# Patient Record
Sex: FEMALE | Race: WHITE | Employment: OTHER | URBAN - METROPOLITAN AREA
[De-identification: names, ages, dates, MRNs, and addresses within clinical notes are randomized per-mention and may not be internally consistent; named-entity substitution may affect disease eponyms.]

---

## 2017-01-12 ENCOUNTER — TELEPHONE (OUTPATIENT)
Dept: FAMILY MEDICINE | Facility: CLINIC | Age: 62
End: 2017-01-12

## 2017-01-12 NOTE — TELEPHONE ENCOUNTER
Panel Management Review      Patient has the following on her problem list:     Diabetes    ASA: Passed    Last A1C  A1C      6.4   3/24/2016  A1C      6.5   7/1/2015  A1C      6.4   10/3/2014  A1C      6.4   1/29/2014  A1C      6.5   6/17/2013  A1C tested: Passed    Last LDL:    CHOL      208   8/15/2016  HDL       77   8/15/2016  LDL      101   8/15/2016  TRIG      151   8/15/2016  CHOLHDLRATIO      2.0   7/1/2015  NHDL      131   8/15/2016    Is the patient on a Statin? YES             Is the patient on Aspirin? NO    Medications     HMG CoA Reductase Inhibitors    atorvastatin (LIPITOR) 40 MG tablet    Salicylates    aspirin 81 MG tablet          Last three blood pressure readings:  BP Readings from Last 3 Encounters:   03/24/16 150/90   11/20/15 128/84   09/10/15 112/62       Date of last diabetes office visit: 3/2016     Tobacco History:     History   Smoking status     Former Smoker -- 20 years   Smokeless tobacco     Never Used     Comment: 1 pack every 2 weeks---would like to quit             Composite cancer screening  Chart review shows that this patient is due/due soon for the following None  Summary:    Patient is due/failing the following:   A1C and FOLLOW UP    Action needed:   Patient needs office visit for DM 2 and fasting labs March 2017.    Type of outreach:    please call pt.     Questions for provider review:    None                                                                                                                                    Zuri William PA-C       Chart routed to Care Team .

## 2017-01-20 NOTE — TELEPHONE ENCOUNTER
Pt returned call and was given below message, states will call back to schedule an appt     Renee Natarajan/RAUL  [place]---[facility]

## 2017-02-09 DIAGNOSIS — E78.5 HYPERLIPIDEMIA LDL GOAL <100: Primary | ICD-10-CM

## 2017-02-09 RX ORDER — ATORVASTATIN CALCIUM 40 MG/1
40 TABLET, FILM COATED ORAL DAILY
Qty: 90 TABLET | Refills: 0 | Status: SHIPPED | OUTPATIENT
Start: 2017-02-09 | End: 2017-03-21

## 2017-02-09 NOTE — TELEPHONE ENCOUNTER
Atorvastatin 40mg       Last Written Prescription Date: 08/06/2016  Last Fill Quantity: 90, 11/11/2016# refills: 1    Last Office Visit with [facility], P or [facility] prescribing provider:  03/24/2016-Zuri William   Future Office Visit:      BP Readings from Last 3 Encounters:   03/24/16 150/90   11/20/15 128/84   09/10/15 112/62     ALT       29   8/15/2016  CHOL      208   8/15/2016  HDL       77   8/15/2016  LDL      101   8/15/2016  TRIG      151   8/15/2016  CHOLHDLRATIO      2.0   7/1/2015

## 2017-02-09 NOTE — TELEPHONE ENCOUNTER
Routing refill request to provider for review/approval because:  Pt is past due for DM f/u and HCM-   She states she spoke with someone and they said she did not need to be seen until March. She did not want to set up appt at this time.     OK for refill?    Юлия Woo RN\

## 2017-03-15 ENCOUNTER — TELEPHONE (OUTPATIENT)
Dept: FAMILY MEDICINE | Facility: CLINIC | Age: 62
End: 2017-03-15

## 2017-03-15 DIAGNOSIS — E11.9 DIABETES MELLITUS WITHOUT COMPLICATION (H): ICD-10-CM

## 2017-03-15 NOTE — TELEPHONE ENCOUNTER
metFORMIN (GLUCOPHAGE-XR) 500 MG 24 hr tablet         Last Written Prescription Date: 9/22/16  Last Fill Quantity: 90, # refills: 1  Last Office Visit with [facility], [facility] or [facility] prescribing provider:  3/24/2016          BP Readings from Last 3 Encounters:   03/24/16 150/90   11/20/15 128/84   09/10/15 112/62     Lab Results   Component Value Date    MICROL 23 08/15/2016     No results found for: MICROALBUMIN  Creatinine   Date Value Ref Range Status   08/15/2016 0.60 0.52 - 1.04 mg/dL Final   ]  GFR Estimate   Date Value Ref Range Status   08/15/2016 >90  Non  GFR Calc   >60 mL/min/1.7m2 Final   07/01/2015 >90  Non  GFR Calc   >60 mL/min/1.7m2 Final   08/06/2014 87 >60 mL/min/1.7m2 Final     Comment:     Non  GFR Calc     GFR Estimate If Black   Date Value Ref Range Status   08/15/2016 >90   GFR Calc   >60 mL/min/1.7m2 Final   07/01/2015 >90   GFR Calc   >60 mL/min/1.7m2 Final   08/06/2014 >90   GFR Calc   >60 mL/min/1.7m2 Final     Lab Results   Component Value Date    CHOL 208 08/15/2016     Lab Results   Component Value Date    HDL 77 08/15/2016     Lab Results   Component Value Date     08/15/2016     Lab Results   Component Value Date    TRIG 151 08/15/2016     Lab Results   Component Value Date    CHOLHDLRATIO 2.0 07/01/2015     Lab Results   Component Value Date    AST 22 08/15/2016     Lab Results   Component Value Date    ALT 29 08/15/2016     Lab Results   Component Value Date    A1C 6.4 03/24/2016    A1C 6.5 07/01/2015    A1C 6.4 10/03/2014    A1C 6.4 01/29/2014    A1C 6.5 06/17/2013     Potassium   Date Value Ref Range Status   08/15/2016 4.3 3.4 - 5.3 mmol/L Final     LETTY Carlos

## 2017-03-16 RX ORDER — METFORMIN HCL 500 MG
TABLET, EXTENDED RELEASE 24 HR ORAL
Qty: 30 TABLET | Refills: 0 | Status: SHIPPED | OUTPATIENT
Start: 2017-03-16 | End: 2017-03-21

## 2017-03-16 NOTE — TELEPHONE ENCOUNTER
Routing refill request to provider for review/approval because:  Gladys given x1 and patient did not follow up, please advise  Kota Briggs RN, BSN

## 2017-03-21 ENCOUNTER — OFFICE VISIT (OUTPATIENT)
Dept: FAMILY MEDICINE | Facility: CLINIC | Age: 62
End: 2017-03-21
Payer: COMMERCIAL

## 2017-03-21 VITALS
RESPIRATION RATE: 16 BRPM | OXYGEN SATURATION: 98 % | HEIGHT: 63 IN | TEMPERATURE: 98.4 F | WEIGHT: 230.2 LBS | HEART RATE: 76 BPM | BODY MASS INDEX: 40.79 KG/M2 | DIASTOLIC BLOOD PRESSURE: 89 MMHG | SYSTOLIC BLOOD PRESSURE: 168 MMHG

## 2017-03-21 DIAGNOSIS — I10 ESSENTIAL HYPERTENSION WITH GOAL BLOOD PRESSURE LESS THAN 140/90: ICD-10-CM

## 2017-03-21 DIAGNOSIS — E78.5 HYPERLIPIDEMIA LDL GOAL <100: ICD-10-CM

## 2017-03-21 DIAGNOSIS — Z11.59 NEED FOR HEPATITIS C SCREENING TEST: ICD-10-CM

## 2017-03-21 DIAGNOSIS — G47.00 PERSISTENT INSOMNIA: ICD-10-CM

## 2017-03-21 DIAGNOSIS — E11.9 DIABETES MELLITUS WITHOUT COMPLICATION (H): Primary | ICD-10-CM

## 2017-03-21 DIAGNOSIS — Z13.89 SCREENING FOR DIABETIC PERIPHERAL NEUROPATHY: ICD-10-CM

## 2017-03-21 DIAGNOSIS — F33.0 MAJOR DEPRESSIVE DISORDER, RECURRENT EPISODE, MILD (H): ICD-10-CM

## 2017-03-21 LAB
ANION GAP SERPL CALCULATED.3IONS-SCNC: 8 MMOL/L (ref 3–14)
BUN SERPL-MCNC: 14 MG/DL (ref 7–30)
CALCIUM SERPL-MCNC: 8.8 MG/DL (ref 8.5–10.1)
CHLORIDE SERPL-SCNC: 106 MMOL/L (ref 94–109)
CO2 SERPL-SCNC: 28 MMOL/L (ref 20–32)
CREAT SERPL-MCNC: 0.59 MG/DL (ref 0.52–1.04)
GFR SERPL CREATININE-BSD FRML MDRD: ABNORMAL ML/MIN/1.7M2
GLUCOSE SERPL-MCNC: 127 MG/DL (ref 70–99)
HBA1C MFR BLD: 6.6 % (ref 4.3–6)
HCV AB SERPL QL IA: NORMAL
POTASSIUM SERPL-SCNC: 4.6 MMOL/L (ref 3.4–5.3)
SODIUM SERPL-SCNC: 142 MMOL/L (ref 133–144)

## 2017-03-21 PROCEDURE — 86803 HEPATITIS C AB TEST: CPT | Performed by: PHYSICIAN ASSISTANT

## 2017-03-21 PROCEDURE — 99207 C FOOT EXAM  NO CHARGE: CPT | Performed by: PHYSICIAN ASSISTANT

## 2017-03-21 PROCEDURE — 99214 OFFICE O/P EST MOD 30 MIN: CPT | Performed by: PHYSICIAN ASSISTANT

## 2017-03-21 PROCEDURE — 83036 HEMOGLOBIN GLYCOSYLATED A1C: CPT | Performed by: PHYSICIAN ASSISTANT

## 2017-03-21 PROCEDURE — 36415 COLL VENOUS BLD VENIPUNCTURE: CPT | Performed by: PHYSICIAN ASSISTANT

## 2017-03-21 PROCEDURE — 80048 BASIC METABOLIC PNL TOTAL CA: CPT | Performed by: PHYSICIAN ASSISTANT

## 2017-03-21 RX ORDER — ZOLPIDEM TARTRATE 5 MG/1
5 TABLET ORAL
Qty: 30 TABLET | Refills: 1 | Status: SHIPPED | OUTPATIENT
Start: 2017-03-21 | End: 2017-06-07

## 2017-03-21 RX ORDER — LISINOPRIL AND HYDROCHLOROTHIAZIDE 12.5; 2 MG/1; MG/1
1 TABLET ORAL DAILY
Qty: 30 TABLET | Refills: 1 | Status: SHIPPED | OUTPATIENT
Start: 2017-03-21 | End: 2017-04-04

## 2017-03-21 RX ORDER — LISINOPRIL 5 MG/1
5 TABLET ORAL DAILY
Qty: 90 TABLET | Refills: 1 | Status: CANCELLED | OUTPATIENT
Start: 2017-03-21

## 2017-03-21 RX ORDER — ESZOPICLONE 3 MG/1
3 TABLET, FILM COATED ORAL AT BEDTIME
Qty: 90 TABLET | Refills: 1 | Status: CANCELLED | OUTPATIENT
Start: 2017-03-21

## 2017-03-21 RX ORDER — CITALOPRAM HYDROBROMIDE 40 MG/1
40 TABLET ORAL DAILY
Qty: 90 TABLET | Refills: 1 | Status: SHIPPED | OUTPATIENT
Start: 2017-03-21 | End: 2017-10-10

## 2017-03-21 RX ORDER — ATORVASTATIN CALCIUM 40 MG/1
40 TABLET, FILM COATED ORAL DAILY
Qty: 90 TABLET | Refills: 1 | Status: SHIPPED | OUTPATIENT
Start: 2017-03-21 | End: 2017-10-10

## 2017-03-21 RX ORDER — BUPROPION HYDROCHLORIDE 150 MG/1
150 TABLET ORAL EVERY MORNING
Qty: 30 TABLET | Refills: 1 | Status: SHIPPED | OUTPATIENT
Start: 2017-03-21 | End: 2017-04-04

## 2017-03-21 RX ORDER — METFORMIN HCL 500 MG
TABLET, EXTENDED RELEASE 24 HR ORAL
Qty: 90 TABLET | Refills: 1 | Status: SHIPPED | OUTPATIENT
Start: 2017-03-21 | End: 2017-10-04

## 2017-03-21 ASSESSMENT — ANXIETY QUESTIONNAIRES
1. FEELING NERVOUS, ANXIOUS, OR ON EDGE: SEVERAL DAYS
6. BECOMING EASILY ANNOYED OR IRRITABLE: MORE THAN HALF THE DAYS
7. FEELING AFRAID AS IF SOMETHING AWFUL MIGHT HAPPEN: MORE THAN HALF THE DAYS
IF YOU CHECKED OFF ANY PROBLEMS ON THIS QUESTIONNAIRE, HOW DIFFICULT HAVE THESE PROBLEMS MADE IT FOR YOU TO DO YOUR WORK, TAKE CARE OF THINGS AT HOME, OR GET ALONG WITH OTHER PEOPLE: NOT DIFFICULT AT ALL
5. BEING SO RESTLESS THAT IT IS HARD TO SIT STILL: SEVERAL DAYS
3. WORRYING TOO MUCH ABOUT DIFFERENT THINGS: SEVERAL DAYS
GAD7 TOTAL SCORE: 9
2. NOT BEING ABLE TO STOP OR CONTROL WORRYING: SEVERAL DAYS

## 2017-03-21 ASSESSMENT — PATIENT HEALTH QUESTIONNAIRE - PHQ9: 5. POOR APPETITE OR OVEREATING: SEVERAL DAYS

## 2017-03-21 NOTE — PROGRESS NOTES
"    SUBJECTIVE:                                                    Angelique Sharp is a 61 year old female who presents to clinic today for the following health issues:      Diabetes Follow-up      Patient is checking blood sugars: not at all    Diabetic concerns: None     Symptoms of hypoglycemia (low blood sugar): none     Paresthesias (numbness or burning in feet) or sores: No     Date of last diabetic eye exam: 2 months       Depression and Anxiety Follow-Up    Status since last visit: Worsened     Other associated symptoms:over eat=ting,fatgue and little energy     Complicating factors:     Significant life event: Yes-       Current substance abuse: None    PHQ-9 SCORE 7/1/2015 9/10/2015 3/25/2016   Total Score 0 - -   Total Score - 7 9     PILLO-7 SCORE 7/1/2015 9/10/2015 3/25/2016   Total Score 0 - -   Total Score - 7 5        PHQ-9  English      PHQ-9   Any Language     GAD7       Amount of exercise or physical activity: 2-3 days/week for an average of 15-30 minutes    Problems taking medications regularly: No    Medication side effects: none    Diet: regular (no restrictions)      Medication Followup of Lunesta    Taking Medication as prescribed: yes    Side Effects:  None    Medication Helping Symptoms:  Yes    Would like \"cheaper\" generic medication, pharmacy told her this is available.      Hypertension Follow-up      Outpatient blood pressures are not being checked.    Low Salt Diet: not monitoring salt    Never took lisinopril that was prescribed to her.    No symptoms.        Problem list and histories reviewed & adjusted, as indicated.  Additional history: as documented    Patient Active Problem List   Diagnosis     Pain in limb     Other hammer toe (acquired)     Hallux valgus, acquired     Mild major depression (H)     Hypertension     HTN, goal below 140/90     GERD (gastroesophageal reflux disease)     Hyperlipidemia LDL goal <100     Vitamin D deficiency     Bloody diarrhea     Insomnia     " "Obesity     Diabetes mellitus without complication (H)     Past Surgical History   Procedure Laterality Date     C nonspecific procedure       Lt. leg vein stripping     C nonspecific procedure        x 1     C nonspecific procedure       vaginal delivery x 1     Vascular surgery       Colonoscopy       Colonoscopy  2014     Procedure: COLONOSCOPY;  COLONOSCOPY    ;  Surgeon: Stewart Torres MD;  Location:  GI       Social History   Substance Use Topics     Smoking status: Former Smoker     Years: 20.00     Smokeless tobacco: Never Used      Comment: 1 pack every 2 weeks---would like to quit     Alcohol use Yes      Comment: rarely     Family History   Problem Relation Age of Onset     Eye Disorder Maternal Grandmother      glaucoma     DIABETES Maternal Grandmother      Breast Cancer Maternal Aunt            Reviewed and updated as needed this visit by clinical staff  Tobacco  Allergies  Med Hx  Surg Hx  Fam Hx  Soc Hx      Reviewed and updated as needed this visit by Provider         ROS:  Constitutional, HEENT, cardiovascular, pulmonary, GI, , musculoskeletal, neuro, skin, endocrine and psych systems are negative, except as otherwise noted.    OBJECTIVE:                                                    /89  Pulse 76  Temp 98.4  F (36.9  C) (Oral)  Resp 16  Ht 5' 3.25\" (1.607 m)  Wt 230 lb 3.2 oz (104.4 kg)  LMP 2007  SpO2 98%  Breastfeeding? No  BMI 40.46 kg/m2  Body mass index is 40.46 kg/(m^2).  GENERAL APPEARANCE: healthy, alert and no distress  RESP: lungs clear to auscultation - no rales, rhonchi or wheezes  CV: regular rates and rhythm, normal S1 S2, no S3 or S4 and no murmur, click or rub  MS: extremities normal- no gross deformities noted  SKIN: no suspicious lesions or rashes  DIABETIC FOOT EXAM: normal DP and PT pulses, no trophic changes or ulcerative lesions and normal sensory exam  PSYCH: mentation appears normal and affect normal/bright     "     ASSESSMENT/PLAN:                                                            1. Diabetes mellitus without complication (H)  Not checking sugars, encouraged pt to do so,   Work on weight loss  - FOOT EXAM  NO CHARGE [23643.646]  - HEMOGLOBIN A1C  - metFORMIN (GLUCOPHAGE-XR) 500 MG 24 hr tablet; TAKE 1 TABLET (500 MG) BY MOUTH DAILY (WITH DINNER)  Dispense: 90 tablet; Refill: 1    2. Major depressive disorder, recurrent episode, mild (H)  Will add wellbutrin, recheck in 2-3 weeks.   - DEPRESSION ACTION PLAN (DAP) Order [[id number]]  - citalopram (CELEXA) 40 MG tablet; Take 1 tablet (40 mg) by mouth daily  Dispense: 90 tablet; Refill: 1  - buPROPion (WELLBUTRIN XL) 150 MG 24 hr tablet; Take 1 tablet (150 mg) by mouth every morning  Dispense: 30 tablet; Refill: 1    3. Essential hypertension with goal blood pressure less than 140/90  Please start this medication!!!!!  Recheck in 1-2 weeks  The patient indicates understanding of these issues and agrees with the plan.    - lisinopril-hydrochlorothiazide (PRINZIDE/ZESTORETIC) 20-12.5 MG per tablet; Take 1 tablet by mouth daily  Dispense: 30 tablet; Refill: 1  - Basic metabolic panel  (Ca, Cl, CO2, Creat, Gluc, K, Na, BUN)    4. Need for hepatitis C screening test    - Hepatitis C Screen Reflex to HCV RNA Quant and Genotype    5. Hyperlipidemia LDL goal <100    - atorvastatin (LIPITOR) 40 MG tablet; Take 1 tablet (40 mg) by mouth daily  Dispense: 90 tablet; Refill: 1    6. Persistent insomnia  Will change to Ambien from Lunesta, recheck in 3 weeks.   - zolpidem (AMBIEN) 5 MG tablet; Take 1 tablet (5 mg) by mouth nightly as needed for sleep  Dispense: 30 tablet; Refill: 1    7. Screening for diabetic peripheral neuropathy    - FOOT EXAM  NO CHARGE [10644.853]        Zuri William PA-C, PACharlyC  [facility]

## 2017-03-21 NOTE — NURSING NOTE
"Chief Complaint   Patient presents with     Recheck Medication       Initial BP (!) 170/95 (BP Location: Right arm, Patient Position: Chair, Cuff Size: Adult Large)  Pulse 76  Temp 98.4  F (36.9  C) (Oral)  Resp 16  Ht 5' 3.25\" (1.607 m)  Wt 230 lb 3.2 oz (104.4 kg)  LMP 08/01/2007  SpO2 98%  Breastfeeding? No  BMI 40.46 kg/m2 Estimated body mass index is 40.46 kg/(m^2) as calculated from the following:    Height as of this encounter: 5' 3.25\" (1.607 m).    Weight as of this encounter: 230 lb 3.2 oz (104.4 kg).  Medication Reconciliation: complete Toyin Lim MA  Health Maintenance has been reviewed.       "

## 2017-03-21 NOTE — MR AVS SNAPSHOT
After Visit Summary   3/21/2017    Angelique Sharp    MRN: [id number]           Patient Information     Date Of Birth          1955        Visit Information        Provider Department      3/21/2017 7:45 AM Zuri William PA-C [facility]        Today's Diagnoses     Diabetes mellitus without complication (H)    -  1    Need for hepatitis C screening test        Screening for diabetic peripheral neuropathy        Diabetes mellitus without complication        Hyperlipidemia LDL goal <100        Persistent insomnia        Major depressive disorder, recurrent episode, mild (H)        Essential hypertension with goal blood pressure less than 140/90          Care Instructions    Recheck in 2 weeks.   Start BP medication.         Follow-ups after your visit        Who to contact     If you have questions or need follow up information about today's clinic visit or your schedule please contact [facility] directly at [phone].  Normal or non-critical lab and imaging results will be communicated to you by Instamourhart, letter or phone within 4 business days after the clinic has received the results. If you do not hear from us within 7 days, please contact the clinic through Instamourhart or phone. If you have a critical or abnormal lab result, we will notify you by phone as soon as possible.  Submit refill requests through [employer] or call your pharmacy and they will forward the refill request to us. Please allow 3 business days for your refill to be completed.          Additional Information About Your Visit        InstamourharTheCommentor Information     [employer] gives you secure access to your electronic health record. If you see a primary care provider, you can also send messages to your care team and make appointments. If you have questions, please call your primary care clinic.  If you do not have a primary care provider, please call [phone] and they will assist you.    "     Care EveryWhere ID     This is your Care EveryWhere ID. This could be used by other organizations to access your [place] medical records  [id number]        Your Vitals Were     Pulse Temperature Respirations Height Last Period Pulse Oximetry    76 98.4  F (36.9  C) (Oral) 16 5' 3.25\" (1.607 m) 08/01/2007 98%    Breastfeeding? BMI (Body Mass Index)                No 40.46 kg/m2           Blood Pressure from Last 3 Encounters:   03/21/17 (!) 170/95   03/24/16 150/90   11/20/15 128/84    Weight from Last 3 Encounters:   03/21/17 230 lb 3.2 oz (104.4 kg)   03/24/16 222 lb 6.4 oz (100.9 kg)   11/20/15 218 lb (98.9 kg)              We Performed the Following     Basic metabolic panel  (Ca, Cl, CO2, Creat, Gluc, K, Na, BUN)     DEPRESSION ACTION PLAN (DAP) Order [[id number]]     FOOT EXAM  NO CHARGE [85970.114]     HEMOGLOBIN A1C     Hepatitis C Screen Reflex to HCV RNA Quant and Genotype          Today's Medication Changes          These changes are accurate as of: 3/21/17  8:09 AM.  If you have any questions, ask your nurse or doctor.               Start taking these medicines.        Dose/Directions    buPROPion 150 MG 24 hr tablet   Commonly known as:  WELLBUTRIN XL   Used for:  Major depressive disorder, recurrent episode, mild (H)   Started by:  Zuri William PA-C        Dose:  150 mg   Take 1 tablet (150 mg) by mouth every morning   Quantity:  30 tablet   Refills:  1       lisinopril-hydrochlorothiazide 20-12.5 MG per tablet   Commonly known as:  PRINZIDE/ZESTORETIC   Used for:  Essential hypertension with goal blood pressure less than 140/90   Started by:  Zuri William PA-C        Dose:  1 tablet   Take 1 tablet by mouth daily   Quantity:  30 tablet   Refills:  1       zolpidem 5 MG tablet   Commonly known as:  AMBIEN   Used for:  Persistent insomnia   Started by:  Zuri William PA-C        Dose:  5 mg   Take 1 tablet (5 mg) by mouth nightly as needed for sleep   Quantity:  " 30 tablet   Refills:  1         These medicines have changed or have updated prescriptions.        Dose/Directions    atorvastatin 40 MG tablet   Commonly known as:  LIPITOR   This may have changed:  additional instructions   Used for:  Hyperlipidemia LDL goal <100   Changed by:  Zuri William PA-C        Dose:  40 mg   Take 1 tablet (40 mg) by mouth daily   Quantity:  90 tablet   Refills:  1       metFORMIN 500 MG 24 hr tablet   Commonly known as:  GLUCOPHAGE-XR   This may have changed:  See the new instructions.   Used for:  Diabetes mellitus without complication (H)   Changed by:  Zuri William PA-C        TAKE 1 TABLET (500 MG) BY MOUTH DAILY (WITH DINNER)   Quantity:  90 tablet   Refills:  1         Stop taking these medicines if you haven't already. Please contact your care team if you have questions.     lisinopril 5 MG tablet   Commonly known as:  PRINIVIL/ZESTRIL   Stopped by:  Zuri William PA-C                Where to get your medicines      These medications were sent to [place] [address] [place] [facility] [id number] [place] [address]     Phone:  [phone]     atorvastatin 40 MG tablet    buPROPion 150 MG 24 hr tablet    citalopram 40 MG tablet    lisinopril-hydrochlorothiazide 20-12.5 MG per tablet    metFORMIN 500 MG 24 hr tablet         Some of these will need a paper prescription and others can be bought over the counter.  Ask your nurse if you have questions.     Bring a paper prescription for each of these medications     zolpidem 5 MG tablet                Primary Care Provider Office Phone # Fax #    Zuri William PA-C [phone]       [facility] [address] [facility] [address]        Thank you!     Thank you for choosing [facility]  for your care. Our goal is always to provide you with excellent care. Hearing back from our patients is one way we can continue to  improve our services. Please take a few minutes to complete the written survey that you may receive in the mail after your visit with us. Thank you!             Your Updated Medication List - Protect others around you: Learn how to safely use, store and throw away your medicines at www.disposemymeds.org.          This list is accurate as of: 3/21/17  8:09 AM.  Always use your most recent med list.                   Brand Name Dispense Instructions for use    aspirin 81 MG tablet     30 tablet    Take 1 tablet (81 mg) by mouth daily       atorvastatin 40 MG tablet    LIPITOR    90 tablet    Take 1 tablet (40 mg) by mouth daily       blood glucose lancets standard    no brand specified    1 Box    Use to test blood sugar 2 times daily or as directed.       blood glucose monitoring meter device kit    no brand specified    1 kit    Use to test blood sugar 2 times daily or as directed.       blood glucose monitoring test strip    no brand specified    100 each    Use to test blood sugars 2 times daily or as directed       buPROPion 150 MG 24 hr tablet    WELLBUTRIN XL    30 tablet    Take 1 tablet (150 mg) by mouth every morning       citalopram 40 MG tablet    celeXA    90 tablet    Take 1 tablet (40 mg) by mouth daily       eszopiclone 3 MG tablet    LUNESTA    90 tablet    Take 1 tablet (3 mg) by mouth At Bedtime       lisinopril-hydrochlorothiazide 20-12.5 MG per tablet    PRINZIDE/ZESTORETIC    30 tablet    Take 1 tablet by mouth daily       metFORMIN 500 MG 24 hr tablet    GLUCOPHAGE-XR    90 tablet    TAKE 1 TABLET (500 MG) BY MOUTH DAILY (WITH DINNER)       Multi-vitamin Tabs tablet     100 tablet    Take 1 tablet by mouth daily.       omeprazole 20 MG CR capsule    priLOSEC    30 capsule    Take 1 capsule (20 mg) by mouth daily       zolpidem 5 MG tablet    AMBIEN    30 tablet    Take 1 tablet (5 mg) by mouth nightly as needed for sleep

## 2017-03-22 ASSESSMENT — ANXIETY QUESTIONNAIRES: GAD7 TOTAL SCORE: 9

## 2017-03-22 ASSESSMENT — PATIENT HEALTH QUESTIONNAIRE - PHQ9: SUM OF ALL RESPONSES TO PHQ QUESTIONS 1-9: 13

## 2017-04-04 ENCOUNTER — OFFICE VISIT (OUTPATIENT)
Dept: FAMILY MEDICINE | Facility: CLINIC | Age: 62
End: 2017-04-04
Payer: COMMERCIAL

## 2017-04-04 VITALS
OXYGEN SATURATION: 98 % | RESPIRATION RATE: 15 BRPM | SYSTOLIC BLOOD PRESSURE: 137 MMHG | BODY MASS INDEX: 39.51 KG/M2 | HEIGHT: 63 IN | WEIGHT: 223 LBS | TEMPERATURE: 98.4 F | DIASTOLIC BLOOD PRESSURE: 76 MMHG | HEART RATE: 82 BPM

## 2017-04-04 DIAGNOSIS — I10 ESSENTIAL HYPERTENSION WITH GOAL BLOOD PRESSURE LESS THAN 140/90: ICD-10-CM

## 2017-04-04 DIAGNOSIS — K21.9 GASTROESOPHAGEAL REFLUX DISEASE WITHOUT ESOPHAGITIS: ICD-10-CM

## 2017-04-04 DIAGNOSIS — F33.0 MAJOR DEPRESSIVE DISORDER, RECURRENT EPISODE, MILD (H): ICD-10-CM

## 2017-04-04 PROCEDURE — 99214 OFFICE O/P EST MOD 30 MIN: CPT | Performed by: PHYSICIAN ASSISTANT

## 2017-04-04 RX ORDER — LISINOPRIL AND HYDROCHLOROTHIAZIDE 12.5; 2 MG/1; MG/1
1 TABLET ORAL DAILY
Qty: 90 TABLET | Refills: 1 | Status: SHIPPED | OUTPATIENT
Start: 2017-04-04 | End: 2017-05-25 | Stop reason: SINTOL

## 2017-04-04 RX ORDER — BUPROPION HYDROCHLORIDE 150 MG/1
150 TABLET ORAL EVERY MORNING
Qty: 90 TABLET | Refills: 1 | Status: SHIPPED | OUTPATIENT
Start: 2017-04-04 | End: 2017-10-10

## 2017-04-04 NOTE — MR AVS SNAPSHOT
After Visit Summary   4/4/2017    Angelique Sharp    MRN: [id number]           Patient Information     Date Of Birth          1955        Visit Information        Provider Department      4/4/2017 10:30 AM Zuri William PA-C [facility]        Today's Diagnoses     Major depressive disorder, recurrent episode, mild (H)        Gastroesophageal reflux disease without esophagitis        Essential hypertension with goal blood pressure less than 140/90           Follow-ups after your visit        Who to contact     If you have questions or need follow up information about today's clinic visit or your schedule please contact [facility] directly at [phone].  Normal or non-critical lab and imaging results will be communicated to you by MyChart, letter or phone within 4 business days after the clinic has received the results. If you do not hear from us within 7 days, please contact the clinic through Droplet Technologyhart or phone. If you have a critical or abnormal lab result, we will notify you by phone as soon as possible.  Submit refill requests through Trump[employer] or call your pharmacy and they will forward the refill request to us. Please allow 3 business days for your refill to be completed.          Additional Information About Your Visit        MyChart Information     [employer] gives you secure access to your electronic health record. If you see a primary care provider, you can also send messages to your care team and make appointments. If you have questions, please call your primary care clinic.  If you do not have a primary care provider, please call [phone] and they will assist you.        Care EveryWhere ID     This is your Care EveryWhere ID. This could be used by other organizations to access your [place] medical records  [id number]        Your Vitals Were     Pulse Temperature Respirations Height Last Period Pulse Oximetry    82 98.4  F (36.9  " C) (Oral) 15 5' 3.25\" (1.607 m) 08/01/2007 98%    Breastfeeding? BMI (Body Mass Index)                No 39.19 kg/m2           Blood Pressure from Last 3 Encounters:   04/04/17 137/76   03/21/17 168/89   03/24/16 150/90    Weight from Last 3 Encounters:   04/04/17 223 lb (101.2 kg)   03/21/17 230 lb 3.2 oz (104.4 kg)   03/24/16 222 lb 6.4 oz (100.9 kg)              Today, you had the following     No orders found for display         Where to get your medicines      These medications were sent to [place] [address] [place] [facility] [address]  [address] [facility] [address]     Phone:  [phone]     buPROPion 150 MG 24 hr tablet    lisinopril-hydrochlorothiazide 20-12.5 MG per tablet    omeprazole 20 MG CR capsule          Primary Care Provider Office Phone # Fax #    Zuri William PA-C [phone]       [facility] [address] [facility] [address]        Thank you!     Thank you for choosing [facility]  for your care. Our goal is always to provide you with excellent care. Hearing back from our patients is one way we can continue to improve our services. Please take a few minutes to complete the written survey that you may receive in the mail after your visit with us. Thank you!             Your Updated Medication List - Protect others around you: Learn how to safely use, store and throw away your medicines at www.disposemymeds.org.          This list is accurate as of: 4/4/17 10:46 AM.  Always use your most recent med list.                   Brand Name Dispense Instructions for use    aspirin 81 MG tablet     30 tablet    Take 1 tablet (81 mg) by mouth daily       atorvastatin 40 MG tablet    LIPITOR    90 tablet    Take 1 tablet (40 mg) by mouth daily       blood glucose lancets standard    no brand specified    1 Box    Use to test blood sugar 2 times daily or as directed.       blood glucose monitoring meter device kit    no " brand specified    1 kit    Use to test blood sugar 2 times daily or as directed.       blood glucose monitoring test strip    no brand specified    100 each    Use to test blood sugars 2 times daily or as directed       buPROPion 150 MG 24 hr tablet    WELLBUTRIN XL    90 tablet    Take 1 tablet (150 mg) by mouth every morning       citalopram 40 MG tablet    celeXA    90 tablet    Take 1 tablet (40 mg) by mouth daily       eszopiclone 3 MG tablet    LUNESTA    90 tablet    Take 1 tablet (3 mg) by mouth At Bedtime       lisinopril-hydrochlorothiazide 20-12.5 MG per tablet    PRINZIDE/ZESTORETIC    90 tablet    Take 1 tablet by mouth daily       metFORMIN 500 MG 24 hr tablet    GLUCOPHAGE-XR    90 tablet    TAKE 1 TABLET (500 MG) BY MOUTH DAILY (WITH DINNER)       Multi-vitamin Tabs tablet     100 tablet    Take 1 tablet by mouth daily.       omeprazole 20 MG CR capsule    priLOSEC    90 capsule    Take 1 capsule (20 mg) by mouth daily       zolpidem 5 MG tablet    AMBIEN    30 tablet    Take 1 tablet (5 mg) by mouth nightly as needed for sleep

## 2017-04-04 NOTE — PROGRESS NOTES
SUBJECTIVE:                                                    Angelique Sharp is a 61 year old female who presents to clinic today for the following health issues:      Hypertension Follow-up      Outpatient blood pressures are not being checked.    Low Salt Diet: no added salt       Amount of exercise or physical activity: 2-3 days/week for an average of 30-45 minutes    Problems taking medications regularly: No    Medication side effects: none    Diet: low salt        Depression and Anxiety Follow-Up    Status since last visit: Improved     Other associated symptoms:None    Complicating factors:     Significant life event: No     Current substance abuse: None    PHQ-9 SCORE 9/10/2015 3/25/2016 3/21/2017   Total Score - - -   Total Score 7 9 13     PILLO-7 SCORE 9/10/2015 3/25/2016 3/21/2017   Total Score - - -   Total Score 7 5 9        PHQ-9  English      PHQ-9   Any Language     GAD7       Problem list and histories reviewed & adjusted, as indicated.  Additional history: as documented    Patient Active Problem List   Diagnosis     Pain in limb     Other hammer toe (acquired)     Hallux valgus, acquired     Mild major depression (H)     Hypertension     HTN, goal below 140/90     GERD (gastroesophageal reflux disease)     Hyperlipidemia LDL goal <100     Vitamin D deficiency     Bloody diarrhea     Insomnia     Obesity     Diabetes mellitus without complication (H)     Past Surgical History:   Procedure Laterality Date     C NONSPECIFIC PROCEDURE      Lt. leg vein stripping     C NONSPECIFIC PROCEDURE       x 1     C NONSPECIFIC PROCEDURE      vaginal delivery x 1     COLONOSCOPY       COLONOSCOPY  2014    Procedure: COLONOSCOPY;  COLONOSCOPY    ;  Surgeon: Stewart Torres MD;  Location:  GI     VASCULAR SURGERY         Social History   Substance Use Topics     Smoking status: Former Smoker     Years: 20.00     Smokeless tobacco: Never Used      Comment: 1 pack every 2 weeks---would like  "to quit     Alcohol use Yes      Comment: rarely     Family History   Problem Relation Age of Onset     Eye Disorder Maternal Grandmother      glaucoma     DIABETES Maternal Grandmother      Breast Cancer Maternal Aunt            Reviewed and updated as needed this visit by clinical staff       Reviewed and updated as needed this visit by Provider         ROS:  Constitutional, HEENT, cardiovascular, pulmonary, GI, , musculoskeletal, neuro, skin, endocrine and psych systems are negative, except as otherwise noted.    OBJECTIVE:                                                    /76  Pulse 82  Temp 98.4  F (36.9  C) (Oral)  Resp 15  Ht 5' 3.25\" (1.607 m)  Wt 223 lb (101.2 kg)  LMP 08/01/2007  SpO2 98%  Breastfeeding? No  BMI 39.19 kg/m2  Body mass index is 39.19 kg/(m^2).  GENERAL APPEARANCE: healthy, alert and no distress  RESP: lungs clear to auscultation - no rales, rhonchi or wheezes  CV: regular rates and rhythm, normal S1 S2, no S3 or S4 and no murmur, click or rub  MS: extremities normal- no gross deformities noted  SKIN: no suspicious lesions or rashes  NEURO: Normal strength and tone, mentation intact and speech normal  PSYCH: mentation appears normal and affect normal/bright         ASSESSMENT/PLAN:                                                            1. Major depressive disorder, recurrent episode, mild (H)  Using with celexa, mood much improved.   Recheck 6 months.   - buPROPion (WELLBUTRIN XL) 150 MG 24 hr tablet; Take 1 tablet (150 mg) by mouth every morning  Dispense: 90 tablet; Refill: 1    2. Gastroesophageal reflux disease without esophagitis  refill  - omeprazole (PRILOSEC) 20 MG CR capsule; Take 1 capsule (20 mg) by mouth daily  Dispense: 90 capsule; Refill: 3    3. Essential hypertension with goal blood pressure less than 140/90  BP much improved. Checking at home from now on.  Recheck in 6 months.   - lisinopril-hydrochlorothiazide (PRINZIDE/ZESTORETIC) 20-12.5 MG per " tablet; Take 1 tablet by mouth daily  Dispense: 90 tablet; Refill: 1        Zuri William PA-C, PACharlyC  [facility]

## 2017-04-04 NOTE — NURSING NOTE
"Chief Complaint   Patient presents with     Recheck Medication       Initial /83 (BP Location: Right arm, Patient Position: Chair, Cuff Size: Adult Large)  Pulse 82  Temp 98.4  F (36.9  C) (Oral)  Resp 15  Ht 5' 3.25\" (1.607 m)  Wt 223 lb (101.2 kg)  LMP 08/01/2007  SpO2 98%  Breastfeeding? No  BMI 39.19 kg/m2 Estimated body mass index is 39.19 kg/(m^2) as calculated from the following:    Height as of this encounter: 5' 3.25\" (1.607 m).    Weight as of this encounter: 223 lb (101.2 kg).  Medication Reconciliation: complete Toyin Lim MA  Health Maintenance has been reviewed.       "

## 2017-05-25 ENCOUNTER — MYC MEDICAL ADVICE (OUTPATIENT)
Dept: FAMILY MEDICINE | Facility: CLINIC | Age: 62
End: 2017-05-25

## 2017-05-25 DIAGNOSIS — G47.00 PERSISTENT INSOMNIA: ICD-10-CM

## 2017-05-25 DIAGNOSIS — I10 HTN, GOAL BELOW 140/90: Primary | ICD-10-CM

## 2017-05-25 RX ORDER — LOSARTAN POTASSIUM AND HYDROCHLOROTHIAZIDE 12.5; 5 MG/1; MG/1
1 TABLET ORAL DAILY
Qty: 90 TABLET | Refills: 1 | Status: SHIPPED | OUTPATIENT
Start: 2017-05-25 | End: 2017-08-20

## 2017-05-25 RX ORDER — ESZOPICLONE 3 MG/1
3 TABLET, FILM COATED ORAL AT BEDTIME
Qty: 90 TABLET | Refills: 1 | Status: SHIPPED | OUTPATIENT
Start: 2017-05-25 | End: 2017-06-13

## 2017-05-26 ENCOUNTER — TELEPHONE (OUTPATIENT)
Dept: FAMILY MEDICINE | Facility: CLINIC | Age: 62
End: 2017-05-26

## 2017-05-26 DIAGNOSIS — G47.00 PERSISTENT INSOMNIA: ICD-10-CM

## 2017-05-26 NOTE — TELEPHONE ENCOUNTER
Faxed message from [facility]   PA lunesta 3 mg - or please send PA or alt Rx for Zolpidem.   Pt failed zolpidem   PA submitted covermymeds [id number]    Ed Wilkinson RN

## 2017-06-02 NOTE — TELEPHONE ENCOUNTER
We checked status, problem with internet so PA was not submitted in entirety.   We resubmitted today - new KEY: [id number]  [facility] is reviewing your PA request. Typically an electronic response will be received within 72 hours.   Ed Wilkinson RN

## 2017-06-07 NOTE — TELEPHONE ENCOUNTER
Pt calls, informed, willing to go back to zolpidem, needs new rx sent, ok for CJ 6/8, routed, inform pt only if problem, she will f/u with pharmacy    Michelle Acuna RN, BSN  Message handled by Nurse Triage.

## 2017-06-07 NOTE — TELEPHONE ENCOUNTER
Left message for pt to call back re Lunesta 3 mg PA.    Faxed message dated 6/6/17 from [employer].   No PA. Product is plan exclusion for this member so there is no coverage criteria to review and apply. The member may call number on the back of their ID card.   We informed [facility] PA denied.   PA denial placed in Zuri's office for [facility] next week.  Ed Wilkinson RN

## 2017-06-13 RX ORDER — ZOLPIDEM TARTRATE 5 MG/1
5 TABLET ORAL
Qty: 90 TABLET | Refills: 0 | Status: SHIPPED | OUTPATIENT
Start: 2017-06-13 | End: 2017-09-22

## 2017-06-13 NOTE — TELEPHONE ENCOUNTER
Left message on [facility] updating no PA Sherrona, changed to zolpidem Call if questions.   Ed Wilkinson RN

## 2017-08-02 ENCOUNTER — TRANSFERRED RECORDS (OUTPATIENT)
Dept: HEALTH INFORMATION MANAGEMENT | Facility: CLINIC | Age: 62
End: 2017-08-02

## 2017-08-20 DIAGNOSIS — I10 HTN, GOAL BELOW 140/90: ICD-10-CM

## 2017-08-20 NOTE — TELEPHONE ENCOUNTER
Pending Prescriptions:                       Disp   Refills    losartan-hydrochlorothiazide (HYZAAR) 50-*90 tab*1            Sig: TAKE 1 TABLET BY MOUTH DAILY              Last Written Prescription Date: 5/25/2017  Last Fill Quantity: 90, # refills: 1  Last Office Visit with HELEN, ANGELO or [facility] prescribing provider: 4/4/2017Stewart             Potassium   Date Value Ref Range Status   03/21/2017 4.6 3.4 - 5.3 mmol/L Final     Creatinine   Date Value Ref Range Status   03/21/2017 0.59 0.52 - 1.04 mg/dL Final     BP Readings from Last 3 Encounters:   04/04/17 137/76   03/21/17 168/89   03/24/16 150/90

## 2017-08-21 RX ORDER — LOSARTAN POTASSIUM AND HYDROCHLOROTHIAZIDE 12.5; 5 MG/1; MG/1
1 TABLET ORAL DAILY
Qty: 90 TABLET | Refills: 0 | Status: SHIPPED | OUTPATIENT
Start: 2017-08-21 | End: 2017-10-10

## 2017-08-21 NOTE — TELEPHONE ENCOUNTER
Medication is being filled for 1 time refill only due to:  Patient needs to be seen because due in October for 6 month follow up per LOV.     Kota Briggs RN, BSN

## 2017-09-22 ENCOUNTER — TELEPHONE (OUTPATIENT)
Dept: FAMILY MEDICINE | Facility: CLINIC | Age: 62
End: 2017-09-22

## 2017-09-22 DIAGNOSIS — G47.00 PERSISTENT INSOMNIA: ICD-10-CM

## 2017-09-22 NOTE — TELEPHONE ENCOUNTER
Zolpidem      Routing refill request to provider for review/approval because:  Drug not on the [facility], UMP or  Health refill protocol or controlled substance      RX [place] reviewed:  reviewed- no concerns    [place]  [email or web address]      Last filled:    6/14/2017, #90    Doris BRIAN RN, BSN, PHN  [place] [facility]

## 2017-09-22 NOTE — TELEPHONE ENCOUNTER
Controlled Substance Refill Request for ZOLPIDEM TARTRATE 5 MG TABLET    Problem List Complete:  No     PROVIDER TO CONSIDER COMPLETION OF PROBLEM LIST AND OVERVIEW/CONTROLLED SUBSTANCE AGREEMENT    Last Written Prescription Date:  06/13/17  Last Fill Quantity: 90,   # refills: 0    Last Office Visit with G primary care provider: 04/04/17 Zuri Bajwa Office visit:     Controlled substance agreement on file: No.     Processing:  Fax Rx to [facility] pharmacy     checked in past 6 months?  No, route to RN

## 2017-09-26 RX ORDER — ZOLPIDEM TARTRATE 5 MG/1
TABLET ORAL
Qty: 90 TABLET | Refills: 0 | Status: SHIPPED | OUTPATIENT
Start: 2017-09-26 | End: 2017-10-10 | Stop reason: DRUGHIGH

## 2017-10-04 ENCOUNTER — TELEPHONE (OUTPATIENT)
Dept: FAMILY MEDICINE | Facility: CLINIC | Age: 62
End: 2017-10-04

## 2017-10-04 DIAGNOSIS — E11.9 DIABETES MELLITUS WITHOUT COMPLICATION (H): ICD-10-CM

## 2017-10-04 NOTE — TELEPHONE ENCOUNTER
METFORMIN  MG TABLET           Last Written Prescription Date: 03/21/17  Last Fill Quantity: 90, # refills: 1  Last Office Visit with G, P or [facility] prescribing provider:  04/04/17 Zuri nina        BP Readings from Last 3 Encounters:   04/04/17 137/76   03/21/17 168/89   03/24/16 150/90     Lab Results   Component Value Date    MICROL 23 08/15/2016     Lab Results   Component Value Date    UMALCR 15.23 08/15/2016     Creatinine   Date Value Ref Range Status   03/21/2017 0.59 0.52 - 1.04 mg/dL Final   ]  GFR Estimate   Date Value Ref Range Status   03/21/2017 >90  Non  GFR Calc   >60 mL/min/1.7m2 Final   08/15/2016 >90  Non  GFR Calc   >60 mL/min/1.7m2 Final   07/01/2015 >90  Non  GFR Calc   >60 mL/min/1.7m2 Final     GFR Estimate If Black   Date Value Ref Range Status   03/21/2017 >90   GFR Calc   >60 mL/min/1.7m2 Final   08/15/2016 >90   GFR Calc   >60 mL/min/1.7m2 Final   07/01/2015 >90   GFR Calc   >60 mL/min/1.7m2 Final     Lab Results   Component Value Date    CHOL 208 08/15/2016     Lab Results   Component Value Date    HDL 77 08/15/2016     Lab Results   Component Value Date     08/15/2016     Lab Results   Component Value Date    TRIG 151 08/15/2016     Lab Results   Component Value Date    CHOLHDLRATIO 2.0 07/01/2015     Lab Results   Component Value Date    AST 22 08/15/2016     Lab Results   Component Value Date    ALT 29 08/15/2016     Lab Results   Component Value Date    A1C 6.6 03/21/2017    A1C 6.4 03/24/2016    A1C 6.5 07/01/2015    A1C 6.4 10/03/2014    A1C 6.4 01/29/2014     Potassium   Date Value Ref Range Status   03/21/2017 4.6 3.4 - 5.3 mmol/L Final

## 2017-10-05 RX ORDER — METFORMIN HCL 500 MG
TABLET, EXTENDED RELEASE 24 HR ORAL
Qty: 90 TABLET | Refills: 0 | Status: SHIPPED | OUTPATIENT
Start: 2017-10-05 | End: 2017-10-10

## 2017-10-05 NOTE — TELEPHONE ENCOUNTER
Routing refill request to provider for review/approval because:  Labs not current:    Annual recommendations -   Creatinine and eGFR   LDL   microalbumin   BP every 6 months, <140/90   HgbA1c every 3 months >8   HgbA1c every 6 months <8        Due for microalb, BP, lipids, OV    Flor Paz RN, BS  Clinical Nurse Triage.

## 2017-10-10 ENCOUNTER — OFFICE VISIT (OUTPATIENT)
Dept: FAMILY MEDICINE | Facility: CLINIC | Age: 62
End: 2017-10-10
Payer: COMMERCIAL

## 2017-10-10 VITALS
SYSTOLIC BLOOD PRESSURE: 122 MMHG | DIASTOLIC BLOOD PRESSURE: 80 MMHG | TEMPERATURE: 98.4 F | BODY MASS INDEX: 38.16 KG/M2 | HEART RATE: 77 BPM | HEIGHT: 63 IN | OXYGEN SATURATION: 97 % | WEIGHT: 215.4 LBS

## 2017-10-10 DIAGNOSIS — E78.5 HYPERLIPIDEMIA LDL GOAL <100: ICD-10-CM

## 2017-10-10 DIAGNOSIS — I10 HTN, GOAL BELOW 140/90: ICD-10-CM

## 2017-10-10 DIAGNOSIS — G47.00 PERSISTENT INSOMNIA: ICD-10-CM

## 2017-10-10 DIAGNOSIS — E11.9 DIABETES MELLITUS WITHOUT COMPLICATION (H): Primary | ICD-10-CM

## 2017-10-10 DIAGNOSIS — F33.0 MAJOR DEPRESSIVE DISORDER, RECURRENT EPISODE, MILD (H): ICD-10-CM

## 2017-10-10 DIAGNOSIS — F41.8 SITUATIONAL ANXIETY: ICD-10-CM

## 2017-10-10 DIAGNOSIS — K21.9 GASTROESOPHAGEAL REFLUX DISEASE WITHOUT ESOPHAGITIS: ICD-10-CM

## 2017-10-10 LAB
CHOLEST SERPL-MCNC: 166 MG/DL
CREAT UR-MCNC: 353 MG/DL
HBA1C MFR BLD: 6.3 % (ref 4.3–6)
HDLC SERPL-MCNC: 62 MG/DL
LDLC SERPL CALC-MCNC: 78 MG/DL
MICROALBUMIN UR-MCNC: 29 MG/L
MICROALBUMIN/CREAT UR: 8.3 MG/G CR (ref 0–25)
NONHDLC SERPL-MCNC: 104 MG/DL
TRIGL SERPL-MCNC: 131 MG/DL

## 2017-10-10 PROCEDURE — 99214 OFFICE O/P EST MOD 30 MIN: CPT | Performed by: PHYSICIAN ASSISTANT

## 2017-10-10 PROCEDURE — 36415 COLL VENOUS BLD VENIPUNCTURE: CPT | Performed by: PHYSICIAN ASSISTANT

## 2017-10-10 PROCEDURE — 80061 LIPID PANEL: CPT | Performed by: PHYSICIAN ASSISTANT

## 2017-10-10 PROCEDURE — 82043 UR ALBUMIN QUANTITATIVE: CPT | Performed by: PHYSICIAN ASSISTANT

## 2017-10-10 PROCEDURE — 83036 HEMOGLOBIN GLYCOSYLATED A1C: CPT | Performed by: PHYSICIAN ASSISTANT

## 2017-10-10 RX ORDER — CITALOPRAM HYDROBROMIDE 40 MG/1
40 TABLET ORAL DAILY
Qty: 90 TABLET | Refills: 1 | Status: SHIPPED | OUTPATIENT
Start: 2017-10-10 | End: 2018-04-17

## 2017-10-10 RX ORDER — BUPROPION HYDROCHLORIDE 150 MG/1
150 TABLET ORAL EVERY MORNING
Qty: 90 TABLET | Refills: 1 | Status: SHIPPED | OUTPATIENT
Start: 2017-10-10 | End: 2018-04-12

## 2017-10-10 RX ORDER — ZOLPIDEM TARTRATE 10 MG/1
10 TABLET ORAL
Qty: 30 TABLET | Refills: 5 | Status: SHIPPED | OUTPATIENT
Start: 2017-10-10 | End: 2018-04-02

## 2017-10-10 RX ORDER — METFORMIN HCL 500 MG
TABLET, EXTENDED RELEASE 24 HR ORAL
Qty: 90 TABLET | Refills: 1 | Status: SHIPPED | OUTPATIENT
Start: 2017-10-10 | End: 2018-04-17

## 2017-10-10 RX ORDER — ATORVASTATIN CALCIUM 40 MG/1
40 TABLET, FILM COATED ORAL DAILY
Qty: 90 TABLET | Refills: 3 | Status: SHIPPED | OUTPATIENT
Start: 2017-10-10 | End: 2018-10-12

## 2017-10-10 RX ORDER — LOSARTAN POTASSIUM AND HYDROCHLOROTHIAZIDE 12.5; 5 MG/1; MG/1
1 TABLET ORAL DAILY
Qty: 90 TABLET | Refills: 1 | Status: SHIPPED | OUTPATIENT
Start: 2017-10-10 | End: 2018-04-17

## 2017-10-10 RX ORDER — OMEPRAZOLE 40 MG/1
40 CAPSULE, DELAYED RELEASE ORAL DAILY
Qty: 90 CAPSULE | Refills: 3 | Status: SHIPPED | OUTPATIENT
Start: 2017-10-10 | End: 2018-10-31

## 2017-10-10 RX ORDER — BUSPIRONE HYDROCHLORIDE 5 MG/1
TABLET ORAL
Qty: 150 TABLET | Refills: 0 | Status: SHIPPED | OUTPATIENT
Start: 2017-10-10 | End: 2017-10-27

## 2017-10-10 ASSESSMENT — ANXIETY QUESTIONNAIRES
3. WORRYING TOO MUCH ABOUT DIFFERENT THINGS: NEARLY EVERY DAY
7. FEELING AFRAID AS IF SOMETHING AWFUL MIGHT HAPPEN: MORE THAN HALF THE DAYS
GAD7 TOTAL SCORE: 20
1. FEELING NERVOUS, ANXIOUS, OR ON EDGE: NEARLY EVERY DAY
6. BECOMING EASILY ANNOYED OR IRRITABLE: NEARLY EVERY DAY
5. BEING SO RESTLESS THAT IT IS HARD TO SIT STILL: NEARLY EVERY DAY
IF YOU CHECKED OFF ANY PROBLEMS ON THIS QUESTIONNAIRE, HOW DIFFICULT HAVE THESE PROBLEMS MADE IT FOR YOU TO DO YOUR WORK, TAKE CARE OF THINGS AT HOME, OR GET ALONG WITH OTHER PEOPLE: VERY DIFFICULT
2. NOT BEING ABLE TO STOP OR CONTROL WORRYING: NEARLY EVERY DAY

## 2017-10-10 ASSESSMENT — PATIENT HEALTH QUESTIONNAIRE - PHQ9
SUM OF ALL RESPONSES TO PHQ QUESTIONS 1-9: 21
5. POOR APPETITE OR OVEREATING: NEARLY EVERY DAY

## 2017-10-10 NOTE — NURSING NOTE
"Chief Complaint   Patient presents with     Recheck Medication     Diabetes       Initial /80 (BP Location: Right arm, Patient Position: Chair, Cuff Size: Adult Large)  Pulse 77  Temp 98.4  F (36.9  C) (Oral)  Ht 5' 3.25\" (1.607 m)  Wt 215 lb 6.4 oz (97.7 kg)  LMP 08/01/2007  SpO2 97%  Breastfeeding? No  BMI 37.86 kg/m2 Estimated body mass index is 37.86 kg/(m^2) as calculated from the following:    Height as of this encounter: 5' 3.25\" (1.607 m).    Weight as of this encounter: 215 lb 6.4 oz (97.7 kg).  Medication Reconciliation: complete Toyin Lim MA  Health Maintenance has been reviewed.       "

## 2017-10-10 NOTE — PROGRESS NOTES
SUBJECTIVE:   Angelique Sharp is a 61 year old female who presents to clinic today for the following health issues:      Diabetes Follow-up    Patient is checking blood sugars: once daily.  Results are as follows:       am -        Diabetic concerns: None     Symptoms of hypoglycemia (low blood sugar): none     Paresthesias (numbness or burning in feet) or sores: No     Date of last diabetic eye exam: 4 months ago    Hyperlipidemia Follow-Up      Rate your low fat/cholesterol diet?: good    Taking statin?  Yes, no muscle aches from statin    Other lipid medications/supplements?:  none    Hypertension Follow-up      Outpatient blood pressures are not being checked.  Low Salt Diet: no added salt  Depression and Anxiety Follow-Up    Status since last visit: Worsened     Other associated symptoms:More agitation, not sleeping well     Complicating factors:     Significant life event: Yes-  Dealing with parents that both live in different states     Current substance abuse: None    PHQ-9 Score and MyChart F/U Questions 9/10/2015 3/25/2016 3/21/2017   Total Score 7 9 13   Q9: Suicide Ideation Not at all Not at all Several days     PILLO-7 SCORE 9/10/2015 3/25/2016 3/21/2017   Total Score - - -   Total Score 7 5 9       PHQ-9  English  PHQ-9   Any Language  GAD7  Suicide Assessment Five-step Evaluation and Treatment (SAFE-T)        Amount of exercise or physical activity: 6-7 days/week for an average of 45-60 minutes    Problems taking medications regularly: No    Medication side effects: none  Diet: regular (no restrictions)          Problem list and histories reviewed & adjusted, as indicated.  Additional history: as documented    Patient Active Problem List   Diagnosis     Pain in limb     Other hammer toe (acquired)     Hallux valgus, acquired     Mild major depression (H)     Hypertension     HTN, goal below 140/90     GERD (gastroesophageal reflux disease)     Hyperlipidemia LDL goal <100     Vitamin D deficiency  "    Bloody diarrhea     Insomnia     Obesity     Diabetes mellitus without complication (H)     Past Surgical History:   Procedure Laterality Date     C NONSPECIFIC PROCEDURE      Lt. leg vein stripping     C NONSPECIFIC PROCEDURE       x 1     C NONSPECIFIC PROCEDURE      vaginal delivery x 1     COLONOSCOPY       COLONOSCOPY  2014    Procedure: COLONOSCOPY;  COLONOSCOPY    ;  Surgeon: Stewart Torres MD;  Location:  GI     VASCULAR SURGERY         Social History   Substance Use Topics     Smoking status: Former Smoker     Years: 20.00     Smokeless tobacco: Never Used      Comment: 1 pack every 2 weeks---would like to quit     Alcohol use Yes      Comment: rarely     Family History   Problem Relation Age of Onset     Eye Disorder Maternal Grandmother      glaucoma     DIABETES Maternal Grandmother      Breast Cancer Maternal Aunt              Reviewed and updated as needed this visit by clinical staffTobacco  Allergies  Med Hx  Surg Hx  Fam Hx  Soc Hx      Reviewed and updated as needed this visit by Provider         ROS:  Constitutional, HEENT, cardiovascular, pulmonary, GI, , musculoskeletal, neuro, skin, endocrine and psych systems are negative, except as otherwise noted.      OBJECTIVE:   /80 (BP Location: Right arm, Patient Position: Chair, Cuff Size: Adult Large)  Pulse 77  Temp 98.4  F (36.9  C) (Oral)  Ht 5' 3.25\" (1.607 m)  Wt 215 lb 6.4 oz (97.7 kg)  LMP 2007  SpO2 97%  Breastfeeding? No  BMI 37.86 kg/m2  Body mass index is 37.86 kg/(m^2).  GENERAL APPEARANCE: healthy, alert and no distress  RESP: lungs clear to auscultation - no rales, rhonchi or wheezes  CV: regular rates and rhythm, normal S1 S2, no S3 or S4 and no murmur, click or rub  SKIN: no suspicious lesions or rashes  NEURO: Normal strength and tone, mentation intact and speech normal  PSYCH: mentation appears normal and affect normal/bright        ASSESSMENT/PLAN:             1. Diabetes mellitus " without complication (H)  Await labs.   Recheck 3-6 months depending on labs.   - blood glucose (NO BRAND SPECIFIED) lancets standard; Use to test blood sugar 2 times daily or as directed.  Dispense: 100 each; Refill: 1  - blood glucose monitoring (NO BRAND SPECIFIED) test strip; Use to test blood sugars 2 times daily or as directed  Dispense: 100 each; Refill: 3  - Hemoglobin A1c  - Albumin Random Urine Quantitative with Creat Ratio  - Lipid panel reflex to direct LDL  - metFORMIN (GLUCOPHAGE-XR) 500 MG 24 hr tablet; TAKE 1 TABLET (500 MG) BY MOUTH DAILY (WITH DINNER)  Dispense: 90 tablet; Refill: 1    2. Major depressive disorder, recurrent episode, mild (H)  Stable. Recheck in 6 months.   - buPROPion (WELLBUTRIN XL) 150 MG 24 hr tablet; Take 1 tablet (150 mg) by mouth every morning  Dispense: 90 tablet; Refill: 1  - citalopram (CELEXA) 40 MG tablet; Take 1 tablet (40 mg) by mouth daily  Dispense: 90 tablet; Refill: 1    3. Situational anxiety  Trial of Buspar   - busPIRone (BUSPAR) 5 MG tablet; Start at 5 mg twice daily for 3 days, then 7.5 mg (1.5 tabs) twice daily for 3 days, then 10 mg (2 tabs) twice daily for 3 days, then 12.5 mg (2.5 tabs) twice daily for 3 days, then 15 mg (3 tabs) twice daily and stay at that dose  Dispense: 150 tablet; Refill: 0    4. Gastroesophageal reflux disease without esophagitis  Will increase to 40 mg daily.   - omeprazole (PRILOSEC) 40 MG capsule; Take 1 capsule (40 mg) by mouth daily Take 30-60 minutes before a meal.  Dispense: 90 capsule; Refill: 3    5. HTN, goal below 140/90  Stable.   - losartan-hydrochlorothiazide (HYZAAR) 50-12.5 MG per tablet; Take 1 tablet by mouth daily SEE PCP OCTOBER  Dispense: 90 tablet; Refill: 1    6. Hyperlipidemia LDL goal <100  Await labs,   - atorvastatin (LIPITOR) 40 MG tablet; Take 1 tablet (40 mg) by mouth daily  Dispense: 90 tablet; Refill: 3    7. Persistent insomnia  Will increase to 10 mg daily, faxed.   - zolpidem (AMBIEN) 10 MG tablet;  Take 1 tablet (10 mg) by mouth nightly as needed for sleep  Dispense: 30 tablet; Refill: 5        Zuri William PA-C, PA-C  [facility]

## 2017-10-10 NOTE — MR AVS SNAPSHOT
After Visit Summary   10/10/2017    Angelique Sharp    MRN: [id number]           Patient Information     Date Of Birth          1955        Visit Information        Provider Department      10/10/2017 8:00 AM Zuri William PA-C [facility]        Today's Diagnoses     Diabetes mellitus without complication (H)    -  1    Major depressive disorder, recurrent episode, mild (H)        Situational anxiety        Gastroesophageal reflux disease without esophagitis        HTN, goal below 140/90        Hyperlipidemia LDL goal <100        Persistent insomnia           Follow-ups after your visit        Who to contact     If you have questions or need follow up information about today's clinic visit or your schedule please contact [facility] directly at [phone].  Normal or non-critical lab and imaging results will be communicated to you by Think-Nowhart, letter or phone within 4 business days after the clinic has received the results. If you do not hear from us within 7 days, please contact the clinic through Think-Nowhart or phone. If you have a critical or abnormal lab result, we will notify you by phone as soon as possible.  Submit refill requests through [email or web address] or call your pharmacy and they will forward the refill request to us. Please allow 3 business days for your refill to be completed.          Additional Information About Your Visit        MyChart Information     [email or web address] gives you secure access to your electronic health record. If you see a primary care provider, you can also send messages to your care team and make appointments. If you have questions, please call your primary care clinic.  If you do not have a primary care provider, please call [phone] and they will assist you.        Care EveryWhere ID     This is your Care EveryWhere ID. This could be used by other organizations to access your [facility] medical records  [id number]       "  Your Vitals Were     Pulse Temperature Height Last Period Pulse Oximetry Breastfeeding?    77 98.4  F (36.9  C) (Oral) 5' 3.25\" (1.607 m) 08/01/2007 97% No    BMI (Body Mass Index)                   37.86 kg/m2            Blood Pressure from Last 3 Encounters:   10/10/17 122/80   04/04/17 137/76   03/21/17 168/89    Weight from Last 3 Encounters:   10/10/17 215 lb 6.4 oz (97.7 kg)   04/04/17 223 lb (101.2 kg)   03/21/17 230 lb 3.2 oz (104.4 kg)              We Performed the Following     Albumin Random Urine Quantitative with Creat Ratio     Hemoglobin A1c     Lipid panel reflex to direct LDL          Today's Medication Changes          These changes are accurate as of: 10/10/17  8:33 AM.  If you have any questions, ask your nurse or doctor.               Start taking these medicines.        Dose/Directions    busPIRone 5 MG tablet   Commonly known as:  BUSPAR   Used for:  Situational anxiety   Started by:  Zuri William PA-C        Start at 5 mg twice daily for 3 days, then 7.5 mg (1.5 tabs) twice daily for 3 days, then 10 mg (2 tabs) twice daily for 3 days, then 12.5 mg (2.5 tabs) twice daily for 3 days, then 15 mg (3 tabs) twice daily and stay at that dose   Quantity:  150 tablet   Refills:  0         These medicines have changed or have updated prescriptions.        Dose/Directions    metFORMIN 500 MG 24 hr tablet   Commonly known as:  GLUCOPHAGE-XR   This may have changed:  See the new instructions.   Used for:  Diabetes mellitus without complication (H)   Changed by:  Zuri William PA-C        TAKE 1 TABLET (500 MG) BY MOUTH DAILY (WITH DINNER)   Quantity:  90 tablet   Refills:  1       omeprazole 40 MG capsule   Commonly known as:  priLOSEC   This may have changed:    - medication strength  - how much to take  - additional instructions   Used for:  Gastroesophageal reflux disease without esophagitis   Changed by:  Zuri William PA-C        Dose:  40 mg   Take 1 capsule (40 " mg) by mouth daily Take 30-60 minutes before a meal.   Quantity:  90 capsule   Refills:  3       zolpidem 10 MG tablet   Commonly known as:  AMBIEN   This may have changed:  See the new instructions.   Used for:  Persistent insomnia   Changed by:  Zuri William PA-C        Dose:  10 mg   Take 1 tablet (10 mg) by mouth nightly as needed for sleep   Quantity:  30 tablet   Refills:  5            Where to get your medicines      These medications were sent to [place] [address] [place] [facility] [address]  [address] [facility] [address]     Phone:  [phone]     atorvastatin 40 MG tablet    blood glucose lancets standard    blood glucose monitoring test strip    buPROPion 150 MG 24 hr tablet    citalopram 40 MG tablet    losartan-hydrochlorothiazide 50-12.5 MG per tablet    metFORMIN 500 MG 24 hr tablet    omeprazole 40 MG capsule         Some of these will need a paper prescription and others can be bought over the counter.  Ask your nurse if you have questions.     Bring a paper prescription for each of these medications     busPIRone 5 MG tablet    zolpidem 10 MG tablet                Primary Care Provider Office Phone # Fax #    Zuri William PA-C [phone] [address] [facility] [address]        Equal Access to Services     JOSEFINA LOUIE AH: Hadii patti escobaro Soomaali, waaxda luqadaha, qaybta kaalmada adeegyada, waxay rosalindain hayleslie clancy. So [facility] [phone].    ATENCIÓN: Si habla español, tiene a cortez disposición servicios gratuitos de asistencia lingüística. ame al [phone].    We comply with applicable federal civil rights laws and [place] laws. We do not discriminate on the basis of race, color, national origin, age, disability, sex, sexual orientation, or gender identity.            Thank you!     Thank you for choosing [facility]  for your care. Our goal is always to provide you with excellent  care. Hearing back from our patients is one way we can continue to improve our services. Please take a few minutes to complete the written survey that you may receive in the mail after your visit with us. Thank you!             Your Updated Medication List - Protect others around you: Learn how to safely use, store and throw away your medicines at www.disposemymeds.org.          This list is accurate as of: 10/10/17  8:33 AM.  Always use your most recent med list.                   Brand Name Dispense Instructions for use Diagnosis    aspirin 81 MG tablet     30 tablet    Take 1 tablet (81 mg) by mouth daily    Diabetes mellitus without complication (H)       atorvastatin 40 MG tablet    LIPITOR    90 tablet    Take 1 tablet (40 mg) by mouth daily    Hyperlipidemia LDL goal <100       blood glucose lancets standard    no brand specified    100 each    Use to test blood sugar 2 times daily or as directed.    Diabetes mellitus without complication (H)       blood glucose monitoring meter device kit    no brand specified    1 kit    Use to test blood sugar 2 times daily or as directed.    Diabetes mellitus without complication (H)       blood glucose monitoring test strip    no brand specified    100 each    Use to test blood sugars 2 times daily or as directed    Diabetes mellitus without complication (H)       buPROPion 150 MG 24 hr tablet    WELLBUTRIN XL    90 tablet    Take 1 tablet (150 mg) by mouth every morning    Major depressive disorder, recurrent episode, mild (H)       busPIRone 5 MG tablet    BUSPAR    150 tablet    Start at 5 mg twice daily for 3 days, then 7.5 mg (1.5 tabs) twice daily for 3 days, then 10 mg (2 tabs) twice daily for 3 days, then 12.5 mg (2.5 tabs) twice daily for 3 days, then 15 mg (3 tabs) twice daily and stay at that dose    Situational anxiety       citalopram 40 MG tablet    celeXA    90 tablet    Take 1 tablet (40 mg) by mouth daily    Major depressive disorder, recurrent episode,  mild (H)       losartan-hydrochlorothiazide 50-12.5 MG per tablet    HYZAAR    90 tablet    Take 1 tablet by mouth daily SEE PCP OCTOBER    HTN, goal below 140/90       metFORMIN 500 MG 24 hr tablet    GLUCOPHAGE-XR    90 tablet    TAKE 1 TABLET (500 MG) BY MOUTH DAILY (WITH DINNER)    Diabetes mellitus without complication (H)       Multi-vitamin Tabs tablet     100 tablet    Take 1 tablet by mouth daily.    Type 2 diabetes, HbA1c goal < 7% (H)       omeprazole 40 MG capsule    priLOSEC    90 capsule    Take 1 capsule (40 mg) by mouth daily Take 30-60 minutes before a meal.    Gastroesophageal reflux disease without esophagitis       zolpidem 10 MG tablet    AMBIEN    30 tablet    Take 1 tablet (10 mg) by mouth nightly as needed for sleep    Persistent insomnia

## 2017-10-11 ASSESSMENT — ANXIETY QUESTIONNAIRES: GAD7 TOTAL SCORE: 20

## 2017-10-12 DIAGNOSIS — F33.0 MAJOR DEPRESSIVE DISORDER, RECURRENT EPISODE, MILD (H): ICD-10-CM

## 2017-10-12 NOTE — TELEPHONE ENCOUNTER
BUPROPION HCL  MG TABLET        Last Written Prescription Date: 10-  Last Fill Quantity: 07/12/2017; #90 refills: 1  Last Office Visit with [facility], [facility] or [facility] prescribing provider:  10- Zuri William.        Last PHQ-9 score on record=   PHQ-9 SCORE 10/10/2017   Total Score -   Total Score 21       Lab Results   Component Value Date    AST 22 08/15/2016     Lab Results   Component Value Date    ALT 29 08/15/2016

## 2017-10-13 RX ORDER — BUPROPION HYDROCHLORIDE 150 MG/1
TABLET ORAL
Start: 2017-10-13

## 2017-10-13 NOTE — TELEPHONE ENCOUNTER
"\"denied\" with note to pharmacist:  Duplicate. #90 + 1 refills sent and E-Prescribing Status: Receipt confirmed by pharmacy (10/10/2017  8:22 AM CDT)  Ed Wilkinson RN    "

## 2017-10-27 ENCOUNTER — MYC MEDICAL ADVICE (OUTPATIENT)
Dept: FAMILY MEDICINE | Facility: CLINIC | Age: 62
End: 2017-10-27

## 2017-10-27 DIAGNOSIS — F41.8 SITUATIONAL ANXIETY: ICD-10-CM

## 2017-10-27 RX ORDER — BUSPIRONE HYDROCHLORIDE 5 MG/1
TABLET ORAL
Qty: 150 TABLET | Refills: 0 | Status: SHIPPED | OUTPATIENT
Start: 2017-10-27 | End: 2018-04-17

## 2017-11-02 DIAGNOSIS — F41.8 SITUATIONAL ANXIETY: ICD-10-CM

## 2017-11-03 RX ORDER — BUSPIRONE HYDROCHLORIDE 5 MG/1
TABLET ORAL
Start: 2017-11-03

## 2017-11-03 RX ORDER — BUSPIRONE HYDROCHLORIDE 15 MG/1
15 TABLET ORAL 2 TIMES DAILY
Qty: 180 TABLET | Refills: 1 | Status: SHIPPED | OUTPATIENT
Start: 2017-11-03 | End: 2018-04-17

## 2017-11-03 NOTE — TELEPHONE ENCOUNTER
Zuri, suleiman pt nearing completion of 10/10/17 starter dose. OK for refill for current dose --15 mg BID?   Ed Wilkinson RN

## 2018-03-01 ENCOUNTER — TRANSFERRED RECORDS (OUTPATIENT)
Dept: HEALTH INFORMATION MANAGEMENT | Facility: CLINIC | Age: 63
End: 2018-03-01

## 2018-03-30 DIAGNOSIS — K21.9 GASTROESOPHAGEAL REFLUX DISEASE WITHOUT ESOPHAGITIS: ICD-10-CM

## 2018-03-30 NOTE — TELEPHONE ENCOUNTER
"Requested Prescriptions   Pending Prescriptions Disp Refills     omeprazole (PRILOSEC) 20 MG CR capsule [Pharmacy Med Name: OMEPRAZOLE DR 20 MG CAPSULE] 90 capsule 3     Sig: TAKE 1 CAPSULE (20 MG) BY MOUTH DAILY    PPI Protocol Passed    3/30/2018  9:32 AM       Passed - Not on Clopidogrel (unless Pantoprazole ordered)       Passed - No diagnosis of osteoporosis on record       Passed - Recent (12 mo) or future (30 days) visit within the authorizing provider's specialty    Patient had office visit in the last 12 months or has a visit in the next 30 days with authorizing provider or within the authorizing provider's specialty.  See \"Patient Info\" tab in inbasket, or \"Choose Columns\" in Meds & Orders section of the refill encounter.           Passed - Patient is age 18 or older       Passed - No active pregnacy on record       Passed - No positive pregnancy test in past 12 months        Last Written Prescription Date:  10/10/17  Last Fill Quantity: 90,  # refills: 3   Last office visit: 10/10/2017 with prescribing provider:  Zuri William   Future Office Visit:      "

## 2018-03-30 NOTE — TELEPHONE ENCOUNTER
Duplicate  E-Prescribing Status: Receipt confirmed by pharmacy (10/10/2017  8:22 AM CDT)  Kota Briggs RN, BSN

## 2018-04-02 ENCOUNTER — TELEPHONE (OUTPATIENT)
Dept: FAMILY MEDICINE | Facility: CLINIC | Age: 63
End: 2018-04-02

## 2018-04-02 DIAGNOSIS — G47.00 PERSISTENT INSOMNIA: ICD-10-CM

## 2018-04-03 NOTE — TELEPHONE ENCOUNTER
Controlled Substance Refill Request for zolpidem  Problem List Complete:  No   checked in past 6 months? 4/3/18 for covering provider.   Report on Andrew's desk.T'd up[ 30 day 0 refills. Due for f/u this month   Ed Wilkinson RN

## 2018-04-03 NOTE — TELEPHONE ENCOUNTER
Requested Prescriptions   Pending Prescriptions Disp Refills     zolpidem (AMBIEN) 10 MG tablet [Pharmacy Med Name: ZOLPIDEM TARTRATE 10 MG TABLET] 30 tablet      Sig: TAKE 1 TAB BY MOUTH NIGHTLY AS NEEDED FOR SLEEP    There is no refill protocol information for this order            Last Written Prescription Date:  10/10/17  Last Fill Quantity: 30 tablet,   # refills: 5  Last Office Visit: 10/10/17 ([facility])  Future Office visit:       Routing refill request to provider for review/approval because:  Drug not on the [facility], [facility] or [facility] refill protocol or controlled substance

## 2018-04-04 RX ORDER — ZOLPIDEM TARTRATE 10 MG/1
TABLET ORAL
Qty: 30 TABLET | Refills: 0 | Status: SHIPPED | OUTPATIENT
Start: 2018-04-04 | End: 2018-04-17

## 2018-04-12 DIAGNOSIS — F33.0 MAJOR DEPRESSIVE DISORDER, RECURRENT EPISODE, MILD (H): ICD-10-CM

## 2018-04-12 NOTE — TELEPHONE ENCOUNTER
"Requested Prescriptions   Pending Prescriptions Disp Refills     buPROPion (WELLBUTRIN XL) 150 MG 24 hr tablet [Pharmacy Med Name: BUPROPION HCL  MG TABLET] 90 tablet 1     Sig: TAKE 1 TABLET (150 MG) BY MOUTH EVERY MORNING    SSRIs Protocol Failed    4/12/2018 10:45 AM       Failed - PHQ-9 score less than 5 in past 6 months    Please review last PHQ-9 score.          Passed - Medication is Bupropion    If the medication is Bupropion (Wellbutrin), and the patient is taking for smoking cessation; OK to refill.         Passed - Patient is age 18 or older       Passed - No active pregnancy on record       Passed - No positive pregnancy test in last 12 months       Passed - Recent (6 mo) or future (30 days) visit within the authorizing provider's specialty    Patient had office visit in the last 6 months or has a visit in the next 30 days with authorizing provider or within the authorizing provider's specialty.  See \"Patient Info\" tab in inbasket, or \"Choose Columns\" in Meds & Orders section of the refill encounter.            Last Written Prescription Date:  10/10/17  Last Fill Quantity: 90,  # refills: 1   Last office visit: 10/10/2017 with prescribing provider:  Zuri William   Future Office Visit:   Next 5 appointments (look out 90 days)     Apr 17, 2018  8:15 AM CDT   [facility]    [id number] [place] [id number] [phone]                   "

## 2018-04-13 RX ORDER — BUPROPION HYDROCHLORIDE 150 MG/1
TABLET ORAL
Qty: 90 TABLET | Refills: 0 | Status: SHIPPED | OUTPATIENT
Start: 2018-04-13 | End: 2018-09-21

## 2018-04-13 NOTE — TELEPHONE ENCOUNTER
Prescription approved per [facility] Refill Protocol.  Ed Wilkinson RN

## 2018-04-17 ENCOUNTER — OFFICE VISIT (OUTPATIENT)
Dept: FAMILY MEDICINE | Facility: CLINIC | Age: 63
End: 2018-04-17
Payer: COMMERCIAL

## 2018-04-17 ENCOUNTER — TELEPHONE (OUTPATIENT)
Dept: FAMILY MEDICINE | Facility: CLINIC | Age: 63
End: 2018-04-17

## 2018-04-17 VITALS
BODY MASS INDEX: 38.45 KG/M2 | DIASTOLIC BLOOD PRESSURE: 86 MMHG | RESPIRATION RATE: 14 BRPM | HEART RATE: 72 BPM | HEIGHT: 63 IN | TEMPERATURE: 98.7 F | WEIGHT: 217 LBS | SYSTOLIC BLOOD PRESSURE: 138 MMHG

## 2018-04-17 DIAGNOSIS — Z13.89 SCREENING FOR DIABETIC PERIPHERAL NEUROPATHY: ICD-10-CM

## 2018-04-17 DIAGNOSIS — I10 HTN, GOAL BELOW 140/90: ICD-10-CM

## 2018-04-17 DIAGNOSIS — G47.00 PERSISTENT INSOMNIA: ICD-10-CM

## 2018-04-17 DIAGNOSIS — F41.8 SITUATIONAL ANXIETY: ICD-10-CM

## 2018-04-17 DIAGNOSIS — F33.0 MAJOR DEPRESSIVE DISORDER, RECURRENT EPISODE, MILD (H): ICD-10-CM

## 2018-04-17 DIAGNOSIS — E11.9 DIABETES MELLITUS WITHOUT COMPLICATION (H): Primary | ICD-10-CM

## 2018-04-17 DIAGNOSIS — Z12.31 VISIT FOR SCREENING MAMMOGRAM: ICD-10-CM

## 2018-04-17 LAB — HBA1C MFR BLD: 6.4 % (ref 0–5.6)

## 2018-04-17 PROCEDURE — 99207 C FOOT EXAM  NO CHARGE: CPT | Performed by: PHYSICIAN ASSISTANT

## 2018-04-17 PROCEDURE — 99214 OFFICE O/P EST MOD 30 MIN: CPT | Performed by: PHYSICIAN ASSISTANT

## 2018-04-17 PROCEDURE — 83036 HEMOGLOBIN GLYCOSYLATED A1C: CPT | Performed by: PHYSICIAN ASSISTANT

## 2018-04-17 PROCEDURE — 84443 ASSAY THYROID STIM HORMONE: CPT | Performed by: PHYSICIAN ASSISTANT

## 2018-04-17 PROCEDURE — 36415 COLL VENOUS BLD VENIPUNCTURE: CPT | Performed by: PHYSICIAN ASSISTANT

## 2018-04-17 PROCEDURE — 80053 COMPREHEN METABOLIC PANEL: CPT | Performed by: PHYSICIAN ASSISTANT

## 2018-04-17 RX ORDER — BUSPIRONE HYDROCHLORIDE 15 MG/1
15 TABLET ORAL 2 TIMES DAILY
Qty: 180 TABLET | Refills: 1 | Status: SHIPPED | OUTPATIENT
Start: 2018-04-17 | End: 2018-09-21

## 2018-04-17 RX ORDER — METFORMIN HCL 500 MG
TABLET, EXTENDED RELEASE 24 HR ORAL
Qty: 90 TABLET | Refills: 1 | Status: SHIPPED | OUTPATIENT
Start: 2018-04-17 | End: 2018-10-31

## 2018-04-17 RX ORDER — LOSARTAN POTASSIUM AND HYDROCHLOROTHIAZIDE 12.5; 5 MG/1; MG/1
1 TABLET ORAL DAILY
Qty: 90 TABLET | Refills: 1 | Status: SHIPPED | OUTPATIENT
Start: 2018-04-17 | End: 2018-10-31

## 2018-04-17 RX ORDER — ZOLPIDEM TARTRATE 10 MG/1
TABLET ORAL
Qty: 30 TABLET | Refills: 0 | Status: SHIPPED | OUTPATIENT
Start: 2018-04-17 | End: 2018-06-01

## 2018-04-17 RX ORDER — BUPROPION HYDROCHLORIDE 75 MG/1
75 TABLET ORAL 2 TIMES DAILY
Qty: 60 TABLET | Refills: 1 | Status: SHIPPED | OUTPATIENT
Start: 2018-04-17 | End: 2018-09-21

## 2018-04-17 RX ORDER — CITALOPRAM HYDROBROMIDE 40 MG/1
40 TABLET ORAL DAILY
Qty: 90 TABLET | Refills: 1 | Status: SHIPPED | OUTPATIENT
Start: 2018-04-17 | End: 2018-10-31

## 2018-04-17 ASSESSMENT — ANXIETY QUESTIONNAIRES
IF YOU CHECKED OFF ANY PROBLEMS ON THIS QUESTIONNAIRE, HOW DIFFICULT HAVE THESE PROBLEMS MADE IT FOR YOU TO DO YOUR WORK, TAKE CARE OF THINGS AT HOME, OR GET ALONG WITH OTHER PEOPLE: NOT DIFFICULT AT ALL
1. FEELING NERVOUS, ANXIOUS, OR ON EDGE: SEVERAL DAYS
GAD7 TOTAL SCORE: 1
3. WORRYING TOO MUCH ABOUT DIFFERENT THINGS: NOT AT ALL
7. FEELING AFRAID AS IF SOMETHING AWFUL MIGHT HAPPEN: NOT AT ALL
2. NOT BEING ABLE TO STOP OR CONTROL WORRYING: NOT AT ALL
5. BEING SO RESTLESS THAT IT IS HARD TO SIT STILL: NOT AT ALL
6. BECOMING EASILY ANNOYED OR IRRITABLE: NOT AT ALL

## 2018-04-17 ASSESSMENT — PATIENT HEALTH QUESTIONNAIRE - PHQ9: 5. POOR APPETITE OR OVEREATING: NOT AT ALL

## 2018-04-17 NOTE — Clinical Note
Please abstract the following data from this visit with this patient into the appropriate field in Epic:  Eye exam with ophthalmology on this date: March 2018; Normal

## 2018-04-17 NOTE — TELEPHONE ENCOUNTER
Herb randhawa from [facility] with question on Bupropion.  States had been on XL and RX today for IR and wondering if this is correct.  Advised of below.  Connie Farias, RN      4/17/18  . Major depressive disorder, recurrent episode, mild (H)  Will try BID IR wellbutrin due to cost. Recheck 6 months.   - buPROPion (WELLBUTRIN) 75 MG tablet; Take 1 tablet (75 mg) by mouth 2 times daily  Dispense: 60 tablet; Refill: 1  - DEPRESSION ACTION PLAN (DAP)  - citalopram (CELEXA) 40 MG tablet; Take 1 tablet (40 mg) by mouth daily  Dispense: 90 tablet; Refill: 1

## 2018-04-17 NOTE — MR AVS SNAPSHOT
"              After Visit Summary   4/17/2018    Angelique Sharp    MRN: [id number]           Patient Information     Date Of Birth          1955        Visit Information        Provider Department      4/17/2018 8:15 AM Zuri William PA-C [facility]        Today's Diagnoses     Visit for screening mammogram        Screening for diabetic peripheral neuropathy           Follow-ups after your visit        Who to contact     If you have questions or need follow up information about today's clinic visit or your schedule please contact [facility] directly at [phone].  Normal or non-critical lab and imaging results will be communicated to you by Bagels and Beanhart, letter or phone within 4 business days after the clinic has received the results. If you do not hear from us within 7 days, please contact the clinic through Bagels and Beanhart or phone. If you have a critical or abnormal lab result, we will notify you by phone as soon as possible.  Submit refill requests through CAPPTURE or call your pharmacy and they will forward the refill request to us. Please allow 3 business days for your refill to be completed.          Additional Information About Your Visit        MyChart Information     CAPPTURE gives you secure access to your electronic health record. If you see a primary care provider, you can also send messages to your care team and make appointments. If you have questions, please call your primary care clinic.  If you do not have a primary care provider, please call [phone] and they will assist you.        Care EveryWhere ID     This is your Care EveryWhere ID. This could be used by other organizations to access your [facility] medical records  [id number]        Your Vitals Were     Pulse Temperature Respirations Height Last Period BMI (Body Mass Index)    72 98.7  F (37.1  C) (Oral) 14 5' 3.25\" (1.607 m) 08/01/2007 38.14 kg/m2       Blood Pressure from Last 3 Encounters: "   04/17/18 138/86   10/10/17 122/80   04/04/17 137/76    Weight from Last 3 Encounters:   04/17/18 217 lb (98.4 kg)   10/10/17 215 lb 6.4 oz (97.7 kg)   04/04/17 223 lb (101.2 kg)              Today, you had the following     No orders found for display       Primary Care Provider Office Phone # Fax #    Zuri William PA-C [phone] [address] [facility] [address]        Equal Access to Services     [facility]: Hadii aad ku hadasho Soomaali, waaxda luqadaha, qaybta kaalmada aderoxyyaandrew, paulino alvarez . So [facility] [phone].    ATENCIÓN: Si habla español, tiene a cortez disposición servicios gratuitos de asistencia lingüística. LlK[facility]tering [facility] [phone].    We comply with applicable federal civil rights laws and [place] laws. We do not discriminate on the basis of race, color, national origin, age, disability, sex, sexual orientation, or gender identity.            Thank you!     Thank you for choosing [facility]  for your care. Our goal is always to provide you with excellent care. Hearing back from our patients is one way we can continue to improve our services. Please take a few minutes to complete the written survey that you may receive in the mail after your visit with us. Thank you!             Your Updated Medication List - Protect others around you: Learn how to safely use, store and throw away your medicines at www.disposemymeds.org.          This list is accurate as of 4/17/18  8:21 AM.  Always use your most recent med list.                   Brand Name Dispense Instructions for use Diagnosis    aspirin 81 MG tablet     30 tablet    Take 1 tablet (81 mg) by mouth daily    Diabetes mellitus without complication (H)       atorvastatin 40 MG tablet    LIPITOR    90 tablet    Take 1 tablet (40 mg) by mouth daily    Hyperlipidemia LDL goal <100       blood glucose lancets standard    no brand specified    100 each    Use to test blood  sugar 2 times daily or as directed.    Diabetes mellitus without complication (H)       blood glucose monitoring meter device kit    no brand specified    1 kit    Use to test blood sugar 2 times daily or as directed.    Diabetes mellitus without complication (H)       blood glucose monitoring test strip    no brand specified    100 each    Use to test blood sugars 2 times daily or as directed    Diabetes mellitus without complication (H)       buPROPion 150 MG 24 hr tablet    WELLBUTRIN XL    90 tablet    TAKE 1 TABLET (150 MG) BY MOUTH EVERY MORNING    Major depressive disorder, recurrent episode, mild (H)       busPIRone 15 MG tablet    BUSPAR    180 tablet    Take 1 tablet (15 mg) by mouth 2 times daily    Situational anxiety       citalopram 40 MG tablet    celeXA    90 tablet    Take 1 tablet (40 mg) by mouth daily    Major depressive disorder, recurrent episode, mild (H)       losartan-hydrochlorothiazide 50-12.5 MG per tablet    HYZAAR    90 tablet    Take 1 tablet by mouth daily SEE PCP OCTOBER    HTN, goal below 140/90       metFORMIN 500 MG 24 hr tablet    GLUCOPHAGE-XR    90 tablet    TAKE 1 TABLET (500 MG) BY MOUTH DAILY (WITH DINNER)    Diabetes mellitus without complication (H)       Multi-vitamin Tabs tablet     100 tablet    Take 1 tablet by mouth daily.    Type 2 diabetes, HbA1c goal < 7% (H)       omeprazole 40 MG capsule    priLOSEC    90 capsule    Take 1 capsule (40 mg) by mouth daily Take 30-60 minutes before a meal.    Gastroesophageal reflux disease without esophagitis       VITAMIN B 12 PO      Take 1,000 mcg by mouth        VITAMIN D (CHOLECALCIFEROL) PO      Take 2,000 Units by mouth daily        zolpidem 10 MG tablet    AMBIEN    30 tablet    TAKE 1 TAB BY MOUTH NIGHTLY AS NEEDED FOR SLEEP    Persistent insomnia

## 2018-04-17 NOTE — PROGRESS NOTES
SUBJECTIVE:   Angelique Sharp is a 62 year old female who presents to clinic today for the following health issues:      Diabetes Follow-up      Patient is checking blood sugars: not at all    Diabetic concerns: None     Symptoms of hypoglycemia (low blood sugar): none     Paresthesias (numbness or burning in feet) or sores: No     Date of last diabetic eye exam: 3/2018-[facility] in [place]     Hyperlipidemia Follow-Up      Rate your low fat/cholesterol diet?: good    Taking statin?  Yes, no muscle aches from statin    Other lipid medications/supplements?:  none    Hypertension Follow-up      Outpatient blood pressures are being checked at home.  Results are not sure about exact numbers, she checks her BP when she feels it may be high.    Low Salt Diet: no added salt    BP Readings from Last 2 Encounters:   10/10/17 122/80   04/04/17 137/76     Hemoglobin A1C (%)   Date Value   10/10/2017 6.3 (H)   03/21/2017 6.6 (H)     LDL Cholesterol Calculated (mg/dL)   Date Value   10/10/2017 78   08/15/2016 101 (H)     Depression Followup    Status since last visit: Worsened, due to weather, can't walk her dogs, run errands     See PHQ-9 for current symptoms.  Other associated symptoms: None    Complicating factors:   Significant life event:  No   Current substance abuse:  None  Anxiety or Panic symptoms:  No    PHQ-9 3/25/2016 3/21/2017 10/10/2017   Total Score 9 13 21   Q9: Suicide Ideation Not at all Several days Several days     In the past two weeks have you had thoughts of suicide or self-harm?  No.    Do you have concerns about your personal safety or the safety of others?   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  No.    Do you have concerns about your personal safety or the safety of others?   No  PHQ-9  English  PHQ-9   Any Language  PILLO-7  Suicide Assessment Five-step Evaluation and Treatment (SAFE-T)    Problem list and histories reviewed & adjusted, as indicated.  Additional history: as documented    Patient Active Problem List   Diagnosis     Pain in limb     Other hammer toe (acquired)     Hallux valgus, acquired     Mild major depression (H)     Hypertension     HTN, goal below 140/90     GERD (gastroesophageal reflux disease)     Hyperlipidemia LDL goal <100     Vitamin D deficiency     Bloody diarrhea     Insomnia     Obesity     Diabetes mellitus without complication (H)     Past Surgical History:   Procedure Laterality Date     C NONSPECIFIC PROCEDURE      Lt. leg vein stripping     C NONSPECIFIC PROCEDURE       x 1     C NONSPECIFIC PROCEDURE      vaginal delivery x 1     COLONOSCOPY       COLONOSCOPY  2014    Procedure: COLONOSCOPY;  COLONOSCOPY    ;  Surgeon: Stewart oTrres MD;  Location:  GI     VASCULAR SURGERY         Social History   Substance Use Topics     Smoking status: Former Smoker     Years: 20.00     Smokeless tobacco: Never Used      Comment: 1 pack every 2 weeks---would like to quit     Alcohol use Yes      Comment: rarely     Family History   Problem Relation Age of Onset     Eye Disorder Maternal Grandmother      glaucoma     DIABETES Maternal Grandmother      Breast Cancer Maternal Aunt            Reviewed and updated as needed this visit by clinical staff  Tobacco  Allergies  Meds  Med Hx  Surg Hx  Fam Hx  Soc Hx      Reviewed and updated as needed this visit by Provider         ROS:  Constitutional, HEENT,  "cardiovascular, pulmonary, gi and gu systems are negative, except as otherwise noted.    OBJECTIVE:     /86 (BP Location: Right arm, Patient Position: Chair, Cuff Size: Adult Large)  Pulse 72  Temp 98.7  F (37.1  C) (Oral)  Resp 14  Ht 5' 3.25\" (1.607 m)  Wt 217 lb (98.4 kg)  LMP 08/01/2007  BMI 38.14 kg/m2  Body mass index is 38.14 kg/(m^2).  GENERAL APPEARANCE: healthy, alert and no distress  RESP: lungs clear to auscultation - no rales, rhonchi or wheezes  CV: regular rates and rhythm, normal S1 S2, no S3 or S4 and no murmur, click or rub  SKIN: no suspicious lesions or rashes  DIABETIC FOOT EXAM: normal DP and PT pulses, no trophic changes or ulcerative lesions and normal sensory exam  PSYCH: mentation appears normal and affect normal/bright        ASSESSMENT/PLAN:             1. Diabetes mellitus without complication (H)  Await labs. 6 month recheck.   - HEMOGLOBIN A1C  - TSH WITH FREE T4 REFLEX  - Comprehensive metabolic panel  - FOOT EXAM  - metFORMIN (GLUCOPHAGE-XR) 500 MG 24 hr tablet; TAKE 1 TABLET (500 MG) BY MOUTH DAILY (WITH DINNER)  Dispense: 90 tablet; Refill: 1    2. Major depressive disorder, recurrent episode, mild (H)  Will try BID IR wellbutrin due to cost. Recheck 6 months.   - buPROPion (WELLBUTRIN) 75 MG tablet; Take 1 tablet (75 mg) by mouth 2 times daily  Dispense: 60 tablet; Refill: 1  - DEPRESSION ACTION PLAN (DAP)  - citalopram (CELEXA) 40 MG tablet; Take 1 tablet (40 mg) by mouth daily  Dispense: 90 tablet; Refill: 1    3. HTN, goal below 140/90  Stable.   - losartan-hydrochlorothiazide (HYZAAR) 50-12.5 MG per tablet; Take 1 tablet by mouth daily SEE PCP OCTOBER  Dispense: 90 tablet; Refill: 1    4. Persistent insomnia  Given to TC to fax  - zolpidem (AMBIEN) 10 MG tablet; TAKE 1 TAB BY MOUTH NIGHTLY AS NEEDED FOR SLEEP  Dispense: 30 tablet; Refill: 0    5. Situational anxiety  Stable.   - busPIRone (BUSPAR) 15 MG tablet; Take 1 tablet (15 mg) by mouth 2 times daily  " Dispense: 180 tablet; Refill: 1    6. Screening for diabetic peripheral neuropathy    - FOOT EXAM    7. Visit for screening mammogram    - *MA Screening Digital Bilateral; Future        Zuri William PA-C  [facility]

## 2018-04-18 LAB
ALBUMIN SERPL-MCNC: 3.5 G/DL (ref 3.4–5)
ALP SERPL-CCNC: 100 U/L (ref 40–150)
ALT SERPL W P-5'-P-CCNC: 38 U/L (ref 0–50)
ANION GAP SERPL CALCULATED.3IONS-SCNC: 12 MMOL/L (ref 3–14)
AST SERPL W P-5'-P-CCNC: 60 U/L (ref 0–45)
BILIRUB SERPL-MCNC: 0.4 MG/DL (ref 0.2–1.3)
BUN SERPL-MCNC: 16 MG/DL (ref 7–30)
CALCIUM SERPL-MCNC: 8.5 MG/DL (ref 8.5–10.1)
CHLORIDE SERPL-SCNC: 103 MMOL/L (ref 94–109)
CO2 SERPL-SCNC: 23 MMOL/L (ref 20–32)
CREAT SERPL-MCNC: 0.63 MG/DL (ref 0.52–1.04)
GFR SERPL CREATININE-BSD FRML MDRD: >90 ML/MIN/1.7M2
GLUCOSE SERPL-MCNC: 122 MG/DL (ref 70–99)
POTASSIUM SERPL-SCNC: 4.4 MMOL/L (ref 3.4–5.3)
PROT SERPL-MCNC: 7.5 G/DL (ref 6.8–8.8)
SODIUM SERPL-SCNC: 138 MMOL/L (ref 133–144)
TSH SERPL DL<=0.005 MIU/L-ACNC: 1.79 MU/L (ref 0.4–4)

## 2018-04-18 ASSESSMENT — PATIENT HEALTH QUESTIONNAIRE - PHQ9: SUM OF ALL RESPONSES TO PHQ QUESTIONS 1-9: 3

## 2018-04-18 ASSESSMENT — ANXIETY QUESTIONNAIRES: GAD7 TOTAL SCORE: 1

## 2018-04-26 DIAGNOSIS — F41.8 SITUATIONAL ANXIETY: ICD-10-CM

## 2018-04-26 RX ORDER — BUSPIRONE HYDROCHLORIDE 15 MG/1
TABLET ORAL
Qty: 180 TABLET | Refills: 1 | Status: SHIPPED | OUTPATIENT
Start: 2018-04-26 | End: 2018-09-21

## 2018-04-26 NOTE — TELEPHONE ENCOUNTER
"Requested Prescriptions   Pending Prescriptions Disp Refills     busPIRone (BUSPAR) 15 MG tablet [Pharmacy Med Name: BUSPIRONE HCL 15 MG TABLET] 180 tablet 1     Sig: TAKE 1 TABLET (15 MG) BY MOUTH 2 TIMES DAILY    Atypical Antidepressants Protocol Passed    4/26/2018 11:32 AM       Passed - Recent (12 mo) or future (30 days) visit within the authorizing provider's specialty    Patient had office visit in the last 12 months or has a visit in the next 30 days with authorizing provider or within the authorizing provider's specialty.  See \"Patient Info\" tab in inbasket, or \"Choose Columns\" in Meds & Orders section of the refill encounter.           Passed - Patient is age 18 or older       Passed - No active pregnancy on record       Passed - No positive pregnancy test in past 12 mos        Last Written Prescription Date:  04/17/18  Last Fill Quantity: 180,  # refills: 1   Last office visit: 4/17/2018 with prescribing provider:  Zuri William   Future Office Visit:      "

## 2018-04-30 ENCOUNTER — RADIANT APPOINTMENT (OUTPATIENT)
Dept: MAMMOGRAPHY | Facility: CLINIC | Age: 63
End: 2018-04-30
Attending: PHYSICIAN ASSISTANT
Payer: COMMERCIAL

## 2018-04-30 DIAGNOSIS — Z12.31 VISIT FOR SCREENING MAMMOGRAM: ICD-10-CM

## 2018-04-30 PROCEDURE — 77067 SCR MAMMO BI INCL CAD: CPT | Mod: TC

## 2018-05-30 ENCOUNTER — TELEPHONE (OUTPATIENT)
Dept: FAMILY MEDICINE | Facility: CLINIC | Age: 63
End: 2018-05-30

## 2018-05-30 DIAGNOSIS — E11.9 DIABETES MELLITUS WITHOUT COMPLICATION (H): Primary | ICD-10-CM

## 2018-05-30 RX ORDER — LANCING DEVICE/LANCETS
KIT MISCELLANEOUS
Qty: 1 EACH | Refills: 0 | Status: SHIPPED | OUTPATIENT
Start: 2018-05-30

## 2018-05-30 NOTE — TELEPHONE ENCOUNTER
Pt calls, cannot locate lancet device, wants new rx sent, will f/u later with pharmacy  Prescription approved per [facility] Refill Protocol.  Michelle Acuna, RN, BSN

## 2018-06-01 DIAGNOSIS — G47.00 PERSISTENT INSOMNIA: ICD-10-CM

## 2018-06-05 RX ORDER — ZOLPIDEM TARTRATE 10 MG/1
TABLET ORAL
Qty: 90 TABLET | Refills: 0 | Status: SHIPPED | OUTPATIENT
Start: 2018-06-05 | End: 2018-09-21

## 2018-06-05 NOTE — TELEPHONE ENCOUNTER
[place] reviewed:  reviewed- no concerns    [place]:  [email or web address]      Last Filled:   5/3/2018, #30  4/4/2018, #30 (Vladimir)  3/7/2018, #30      Doris BRIAN RN, BSN, PHN  Nealprachi Delgado RN

## 2018-07-18 ENCOUNTER — MYC MEDICAL ADVICE (OUTPATIENT)
Dept: FAMILY MEDICINE | Facility: CLINIC | Age: 63
End: 2018-07-18

## 2018-07-18 DIAGNOSIS — G47.00 INSOMNIA, UNSPECIFIED TYPE: Primary | ICD-10-CM

## 2018-07-18 RX ORDER — TRAZODONE HYDROCHLORIDE 50 MG/1
50-100 TABLET, FILM COATED ORAL
Qty: 60 TABLET | Refills: 1 | Status: SHIPPED | OUTPATIENT
Start: 2018-07-18 | End: 2018-10-31

## 2018-08-24 ENCOUNTER — MYC MEDICAL ADVICE (OUTPATIENT)
Dept: FAMILY MEDICINE | Facility: CLINIC | Age: 63
End: 2018-08-24

## 2018-09-21 ENCOUNTER — OFFICE VISIT (OUTPATIENT)
Dept: FAMILY MEDICINE | Facility: CLINIC | Age: 63
End: 2018-09-21
Payer: COMMERCIAL

## 2018-09-21 VITALS
SYSTOLIC BLOOD PRESSURE: 150 MMHG | BODY MASS INDEX: 38.34 KG/M2 | TEMPERATURE: 98.3 F | WEIGHT: 216.4 LBS | HEART RATE: 76 BPM | OXYGEN SATURATION: 95 % | DIASTOLIC BLOOD PRESSURE: 80 MMHG | HEIGHT: 63 IN | RESPIRATION RATE: 16 BRPM

## 2018-09-21 DIAGNOSIS — R10.11 RUQ ABDOMINAL PAIN: Primary | ICD-10-CM

## 2018-09-21 PROCEDURE — 99214 OFFICE O/P EST MOD 30 MIN: CPT | Performed by: PHYSICIAN ASSISTANT

## 2018-09-21 RX ORDER — KETOROLAC TROMETHAMINE 10 MG/1
10 TABLET, FILM COATED ORAL EVERY 6 HOURS PRN
Qty: 20 TABLET | Refills: 0 | Status: SHIPPED | OUTPATIENT
Start: 2018-09-21 | End: 2018-10-31

## 2018-09-21 NOTE — PATIENT INSTRUCTIONS
(R10.11) RUQ abdominal pain  (primary encounter diagnosis)  Comment: has had RUQ pain x 2 days. Getting worse. Suspect gallstones.  Well get CT and determine plan. Please call for results Monday morning   Plan: CT Abdomen Pelvis w Contrast, ketorolac         (TORADOL) 10 MG tablet

## 2018-09-21 NOTE — MR AVS SNAPSHOT
After Visit Summary   9/21/2018    Angelique Sharp    MRN: [id number]           Patient Information     Date Of Birth          1955        Visit Information        Provider Department      9/21/2018 1:45 PM Aaseby-Aguilera, Ramona Ann, PA-C [facility]        Today's Diagnoses     RUQ abdominal pain    -  1      Care Instructions    (R10.11) RUQ abdominal pain  (primary encounter diagnosis)  Comment: has had RUQ pain x 2 days. Getting worse. Suspect gallstones.  Well get CT and determine plan. Please call for results Monday morning   Plan: CT Abdomen Pelvis w Contrast, ketorolac         (TORADOL) 10 MG tablet                      Follow-ups after your visit        Your next 10 appointments already scheduled     Sep 22, 2018  9:00 AM CDT   (Arrive by 8:45 AM)   CT ABDOMEN PELVIS W CONTRAST with RHCT2   [facility] ([facility]    [address] [facility] [address]   [phone]           How do I prepare for my exam? (Food and drink instructions) To prepare: Do not eat or drink for 2 hours before your exam. If you need to take medicine, you may take it with small sips of water. (We may ask you to take liquid medicine as well.)  How do I prepare for my exam? (Other instructions) Please arrive 30 minutes early for your CT.  Once in the department you might be asked to drink water 15-20 minutes prior to your exam.  If indicated you may be asked to drink an oral contrast in advance of your CT.  If this is the case, the imaging team will let you know or be in contact with you prior to your appointment  Patients over 70 or patients with diabetes or kidney problems: If you haven t had a blood test (creatinine test) within the last 30 days, the Cardiologist/Radiologist may require you to get this test prior to your exam.  If you have diabetes:  Continue to take your metformin medication on the day of your exam  What should I wear: Please wear  loose clothing, such as a sweat suit or jogging clothes. Avoid snaps, zippers and other metal. We may ask you to undress and put on a hospital gown.  How long does the exam take: Most scans take less than 20 minutes.  What should I bring: Please bring any scans or X-rays taken at other hospitals, if similar tests were done. Also bring a list of your medicines, including vitamins, minerals and over-the-counter drugs. It is safest to leave personal items at home.  Do I need a : No  is needed.  What do I need to tell my doctor? Be sure to tell your doctor: * If you have any allergies. * If there s any chance you are pregnant. * If you are breastfeeding.  What should I do after the exam: No restrictions, You may resume normal activities.  What is this test: A CT (computed tomography) scan is a series of pictures that allows us to look inside your body. The scanner creates images of the body in cross sections, much like slices of bread. This helps us see any problems more clearly. You may receive contrast (X-ray dye) before or during your scan. You will be asked to drink the contrast.  Who should I call with questions: If you have any questions, please call the Imaging Department where you will have your exam. Directions, parking instructions, and other information is available on our website, [email or web address].              Future tests that were ordered for you today     Open Future Orders        Priority Expected Expires Ordered    CT Abdomen Pelvis w Contrast Routine  9/21/2019 9/21/2018            Who to contact     If you have questions or need follow up information about today's clinic visit or your schedule please contact [facility] directly at [phone].  Normal or non-critical lab and imaging results will be communicated to you by MyChart, letter or phone within 4 business days after the clinic has received the results. If you do not hear from us within 7 days, please contact  "the clinic through [employer] or phone. If you have a critical or abnormal lab result, we will notify you by phone as soon as possible.  Submit refill requests through [employer] or call your pharmacy and they will forward the refill request to us. Please allow 3 business days for your refill to be completed.          Additional Information About Your Visit        My[employer]ants[facility] Information     [employer] gives you secure access to your electronic health record. If you see a primary care provider, you can also send messages to your care team and make appointments. If you have questions, please call your primary care clinic.  If you do not have a primary care provider, please call [phone] and they will assist you.        Care EveryWhere ID     This is your Care EveryWhere ID. This could be used by other organizations to access your [facility] medical records  [id number]        Your Vitals Were     Pulse Temperature Respirations Height Last Period Pulse Oximetry    76 98.3  F (36.8  C) (Oral) 16 5' 3.25\" (1.607 m) 08/01/2007 95%    Breastfeeding? BMI (Body Mass Index)                No 38.03 kg/m2           Blood Pressure from Last 3 Encounters:   09/21/18 150/80   04/17/18 138/86   10/10/17 122/80    Weight from Last 3 Encounters:   09/21/18 216 lb 6.4 oz (98.2 kg)   04/17/18 217 lb (98.4 kg)   10/10/17 215 lb 6.4 oz (97.7 kg)                 Today's Medication Changes          These changes are accurate as of 9/21/18  2:05 PM.  If you have any questions, ask your nurse or doctor.               Start taking these medicines.        Dose/Directions    ketorolac 10 MG tablet   Commonly known as:  TORADOL   Used for:  RUQ abdominal pain   Started by:  Aaseby-Aguilera, Ramona Ann, PA-C        Dose:  10 mg   Take 1 tablet (10 mg) by mouth every 6 hours as needed for moderate pain   Quantity:  20 tablet   Refills:  0            Where to get your medicines      These medications were sent to [facility] [address] [place], [place] - " [address] [facility]  [address] [facility] [address]     Phone:  [phone]     ketorolac 10 MG tablet                Primary Care Provider Office Phone # Fax #    Zuri William PA-C [phone]       [address] [facility] [address]        Equal Access to Services     [facility]: Hadii aad ku hadasho Soomaali, waaxda luqadaha, qaybta kaalmada adeegyada, waxay idiin hayaan adeeg kharash la'aan . So [facility] [phone].    ATENCIÓN: Si habla español, tiene a cortez disposición servicios gratuitos de asistencia lingüística. Sukhwinder al [phone].    We comply with applicable federal civil rights laws and [place] laws. We do not discriminate on the basis of race, color, national origin, age, disability, sex, sexual orientation, or gender identity.            Thank you!     Thank you for choosing [facility]  for your care. Our goal is always to provide you with excellent care. Hearing back from our patients is one way we can continue to improve our services. Please take a few minutes to complete the written survey that you may receive in the mail after your visit with us. Thank you!             Your Updated Medication List - Protect others around you: Learn how to safely use, store and throw away your medicines at www.disposemymeds.org.          This list is accurate as of 9/21/18  2:05 PM.  Always use your most recent med list.                   Brand Name Dispense Instructions for use Diagnosis    aspirin 81 MG tablet     30 tablet    Take 1 tablet (81 mg) by mouth daily    Diabetes mellitus without complication (H)       atorvastatin 40 MG tablet    LIPITOR    90 tablet    Take 1 tablet (40 mg) by mouth daily    Hyperlipidemia LDL goal <100       blood glucose lancets standard    no brand specified    100 each    Use to test blood sugar 2 times daily or as directed.    Diabetes mellitus without complication (H)       blood glucose lancing device     1 each    Lancing  device to be used with lancets.    Diabetes mellitus without complication (H)       blood glucose monitoring meter device kit    no brand specified    1 kit    Use to test blood sugar 2 times daily or as directed.    Diabetes mellitus without complication (H)       blood glucose monitoring test strip    no brand specified    100 each    Use to test blood sugars 2 times daily or as directed    Diabetes mellitus without complication (H)       citalopram 40 MG tablet    celeXA    90 tablet    Take 1 tablet (40 mg) by mouth daily    Major depressive disorder, recurrent episode, mild (H)       ketorolac 10 MG tablet    TORADOL    20 tablet    Take 1 tablet (10 mg) by mouth every 6 hours as needed for moderate pain    RUQ abdominal pain       losartan-hydrochlorothiazide 50-12.5 MG per tablet    HYZAAR    90 tablet    Take 1 tablet by mouth daily SEE PCP OCTOBER    HTN, goal below 140/90       metFORMIN 500 MG 24 hr tablet    GLUCOPHAGE-XR    90 tablet    TAKE 1 TABLET (500 MG) BY MOUTH DAILY (WITH DINNER)    Diabetes mellitus without complication (H)       Multi-vitamin Tabs tablet     100 tablet    Take 1 tablet by mouth daily.    Type 2 diabetes, HbA1c goal < 7% (H)       omeprazole 40 MG capsule    priLOSEC    90 capsule    Take 1 capsule (40 mg) by mouth daily Take 30-60 minutes before a meal.    Gastroesophageal reflux disease without esophagitis       traZODone 50 MG tablet    DESYREL    60 tablet    Take 1-2 tablets ( mg) by mouth nightly as needed for sleep    Insomnia, unspecified type       VITAMIN B 12 PO      Take 1,000 mcg by mouth        VITAMIN D (CHOLECALCIFEROL) PO      Take 2,000 Units by mouth daily

## 2018-09-22 ENCOUNTER — HOSPITAL ENCOUNTER (OUTPATIENT)
Dept: CT IMAGING | Facility: CLINIC | Age: 63
Discharge: HOME | End: 2018-09-22
Attending: PHYSICIAN ASSISTANT | Admitting: PHYSICIAN ASSISTANT
Payer: COMMERCIAL

## 2018-09-22 DIAGNOSIS — R10.11 RUQ ABDOMINAL PAIN: ICD-10-CM

## 2018-09-22 LAB
CREAT BLD-MCNC: 0.8 MG/DL (ref 0.52–1.04)
GFR SERPL CREATININE-BSD FRML MDRD: 73 ML/MIN/1.7M2

## 2018-09-22 PROCEDURE — 74177 CT ABD & PELVIS W/CONTRAST: CPT

## 2018-09-22 PROCEDURE — 82565 ASSAY OF CREATININE: CPT

## 2018-09-22 PROCEDURE — 25000128 H RX IP 250 OP 636: Performed by: PHYSICIAN ASSISTANT

## 2018-09-22 RX ORDER — IOPAMIDOL 755 MG/ML
500 INJECTION, SOLUTION INTRAVASCULAR ONCE
Status: COMPLETED | OUTPATIENT
Start: 2018-09-22 | End: 2018-09-22

## 2018-09-22 RX ADMIN — SODIUM CHLORIDE 65 ML: 9 INJECTION, SOLUTION INTRAVENOUS at 08:54

## 2018-09-22 RX ADMIN — IOPAMIDOL 100 ML: 755 INJECTION, SOLUTION INTRAVENOUS at 08:54

## 2018-09-24 ENCOUNTER — TELEPHONE (OUTPATIENT)
Dept: FAMILY MEDICINE | Facility: CLINIC | Age: 63
End: 2018-09-24

## 2018-09-24 NOTE — TELEPHONE ENCOUNTER
CT is normal.  If having RUQ pain still I would recommend follow-up in clinic to eval for gallbladder issue - US would be preferred if that is the case.

## 2018-09-24 NOTE — TELEPHONE ENCOUNTER
Pt calling for her CT results from Saturday.  She states she was told to call today and another provider would review the results     Kota Briggs RN, BSN

## 2018-10-12 DIAGNOSIS — E78.5 HYPERLIPIDEMIA LDL GOAL <100: ICD-10-CM

## 2018-10-12 RX ORDER — ATORVASTATIN CALCIUM 40 MG/1
TABLET, FILM COATED ORAL
Qty: 30 TABLET | Refills: 0 | Status: SHIPPED | OUTPATIENT
Start: 2018-10-12 | End: 2018-10-31

## 2018-10-12 NOTE — TELEPHONE ENCOUNTER
Routing refill request to provider for review/approval because:  Labs not current:  JURGEN RED RN  [employer] T[facility]

## 2018-10-12 NOTE — TELEPHONE ENCOUNTER
Due for OV with boni for fasting labs and diabetes follow up 1 month given. Please have patient schedule with boni.     -Andrew Gilbert, PaC

## 2018-10-12 NOTE — TELEPHONE ENCOUNTER
"Requested Prescriptions   Pending Prescriptions Disp Refills     atorvastatin (LIPITOR) 40 MG tablet [Pharmacy Med Name: ATORVASTATIN 40 MG TABLET] 90 tablet 3     Sig: TAKE 1 TABLET (40 MG) BY MOUTH DAILY    Statins Protocol Failed    10/12/2018 10:22 AM       Failed - LDL on file in past 12 months    Recent Labs   Lab Test  10/10/17   0817   LDL  78            Passed - No abnormal creatine kinase in past 12 months    No lab results found.            Passed - Recent (12 mo) or future (30 days) visit within the authorizing provider's specialty    Patient had office visit in the last 12 months or has a visit in the next 30 days with authorizing provider or within the authorizing provider's specialty.  See \"Patient Info\" tab in inbasket, or \"Choose Columns\" in Meds & Orders section of the refill encounter.           Passed - Patient is age 18 or older       Passed - No active pregnancy on record       Passed - No positive pregnancy test in past 12 months        Last Written Prescription Date:  10/10/17  Last Fill Quantity: 90,  # refills: 3   Last office visit: 9/21/2018 with prescribing provider:  Ramona Aaseby-Aguilera   Future Office Visit:        "

## 2018-10-24 DIAGNOSIS — K21.9 GASTROESOPHAGEAL REFLUX DISEASE WITHOUT ESOPHAGITIS: ICD-10-CM

## 2018-10-25 NOTE — TELEPHONE ENCOUNTER
"Requested Prescriptions   Pending Prescriptions Disp Refills     omeprazole (PRILOSEC) 20 MG CR capsule [Pharmacy Med Name: OMEPRAZOLE DR 20 MG CAPSULE]    Last Written Prescription Date:  10/10/17  Last Fill Quantity: 90 capsule,  # refills: 3   Last office visit: 9/21/2018 with prescribing provider:  Aaseby-Aguilera   Future Office Visit:   Next 5 appointments (look out 90 days)     Oct 31, 2018  8:00 AM CDT   SHORT with Zuri William PA-C   [address] [address] [phone]                  90 capsule 3     Sig: TAKE 1 CAPSULE (20 MG) BY MOUTH DAILY    PPI Protocol Passed    10/24/2018 10:31 AM       Passed - Not on Clopidogrel (unless Pantoprazole ordered)       Passed - No diagnosis of osteoporosis on record       Passed - Recent (12 mo) or future (30 days) visit within the authorizing provider's specialty    Patient had office visit in the last 12 months or has a visit in the next 30 days with authorizing provider or within the authorizing provider's specialty.  See \"Patient Info\" tab in inbasket, or \"Choose Columns\" in Meds & Orders section of the refill encounter.             Passed - Patient is age 18 or older       Passed - No active pregnacy on record       Passed - No positive pregnancy test in past 12 months          "

## 2018-10-26 NOTE — TELEPHONE ENCOUNTER
Medication is being filled for 1 time refill only. T'd up for additional refill in upcoming visit.   Ed Wilkinson RN

## 2018-10-29 ENCOUNTER — TRANSFERRED RECORDS (OUTPATIENT)
Dept: HEALTH INFORMATION MANAGEMENT | Facility: CLINIC | Age: 63
End: 2018-10-29

## 2018-10-31 ENCOUNTER — OFFICE VISIT (OUTPATIENT)
Dept: FAMILY MEDICINE | Facility: CLINIC | Age: 63
End: 2018-10-31
Payer: COMMERCIAL

## 2018-10-31 VITALS
TEMPERATURE: 98.2 F | WEIGHT: 209 LBS | BODY MASS INDEX: 36.73 KG/M2 | SYSTOLIC BLOOD PRESSURE: 139 MMHG | DIASTOLIC BLOOD PRESSURE: 84 MMHG | RESPIRATION RATE: 16 BRPM | HEART RATE: 87 BPM

## 2018-10-31 DIAGNOSIS — K21.9 GASTROESOPHAGEAL REFLUX DISEASE WITHOUT ESOPHAGITIS: ICD-10-CM

## 2018-10-31 DIAGNOSIS — E78.5 HYPERLIPIDEMIA LDL GOAL <100: ICD-10-CM

## 2018-10-31 DIAGNOSIS — Z23 NEED FOR TDAP VACCINATION: ICD-10-CM

## 2018-10-31 DIAGNOSIS — I10 HTN, GOAL BELOW 140/90: ICD-10-CM

## 2018-10-31 DIAGNOSIS — F33.0 MAJOR DEPRESSIVE DISORDER, RECURRENT EPISODE, MILD (H): ICD-10-CM

## 2018-10-31 DIAGNOSIS — E11.9 DIABETES MELLITUS WITHOUT COMPLICATION (H): Primary | ICD-10-CM

## 2018-10-31 DIAGNOSIS — G47.00 INSOMNIA, UNSPECIFIED TYPE: ICD-10-CM

## 2018-10-31 DIAGNOSIS — R05.9 COUGH: ICD-10-CM

## 2018-10-31 LAB
CHOLEST SERPL-MCNC: 207 MG/DL
CREAT UR-MCNC: 387 MG/DL
HBA1C MFR BLD: 6.2 % (ref 0–5.6)
HDLC SERPL-MCNC: 96 MG/DL
LDLC SERPL CALC-MCNC: 78 MG/DL
MICROALBUMIN UR-MCNC: 41 MG/L
MICROALBUMIN/CREAT UR: 10.54 MG/G CR (ref 0–25)
NONHDLC SERPL-MCNC: 111 MG/DL
TRIGL SERPL-MCNC: 165 MG/DL

## 2018-10-31 PROCEDURE — 36415 COLL VENOUS BLD VENIPUNCTURE: CPT | Performed by: PHYSICIAN ASSISTANT

## 2018-10-31 PROCEDURE — 82043 UR ALBUMIN QUANTITATIVE: CPT | Performed by: PHYSICIAN ASSISTANT

## 2018-10-31 PROCEDURE — 90715 TDAP VACCINE 7 YRS/> IM: CPT | Performed by: PHYSICIAN ASSISTANT

## 2018-10-31 PROCEDURE — 83036 HEMOGLOBIN GLYCOSYLATED A1C: CPT | Performed by: PHYSICIAN ASSISTANT

## 2018-10-31 PROCEDURE — 80061 LIPID PANEL: CPT | Performed by: PHYSICIAN ASSISTANT

## 2018-10-31 PROCEDURE — 90471 IMMUNIZATION ADMIN: CPT | Performed by: PHYSICIAN ASSISTANT

## 2018-10-31 PROCEDURE — 99214 OFFICE O/P EST MOD 30 MIN: CPT | Mod: 25 | Performed by: PHYSICIAN ASSISTANT

## 2018-10-31 RX ORDER — AZITHROMYCIN 250 MG/1
TABLET, FILM COATED ORAL
Qty: 6 TABLET | Refills: 0 | Status: SHIPPED | OUTPATIENT
Start: 2018-10-31 | End: 2019-03-15

## 2018-10-31 RX ORDER — TRIAMCINOLONE ACETONIDE 1 MG/G
CREAM TOPICAL
Refills: 1 | COMMUNITY
Start: 2018-10-29

## 2018-10-31 RX ORDER — OMEPRAZOLE 40 MG/1
40 CAPSULE, DELAYED RELEASE ORAL DAILY
Qty: 90 CAPSULE | Refills: 3 | Status: CANCELLED | OUTPATIENT
Start: 2018-10-31

## 2018-10-31 RX ORDER — LOSARTAN POTASSIUM AND HYDROCHLOROTHIAZIDE 12.5; 5 MG/1; MG/1
1 TABLET ORAL DAILY
Qty: 90 TABLET | Refills: 1 | Status: SHIPPED | OUTPATIENT
Start: 2018-10-31 | End: 2019-03-15

## 2018-10-31 RX ORDER — TRAZODONE HYDROCHLORIDE 50 MG/1
50-100 TABLET, FILM COATED ORAL
Qty: 180 TABLET | Refills: 3 | Status: SHIPPED | OUTPATIENT
Start: 2018-10-31 | End: 2019-11-08

## 2018-10-31 RX ORDER — METFORMIN HCL 500 MG
TABLET, EXTENDED RELEASE 24 HR ORAL
Qty: 90 TABLET | Refills: 1 | Status: SHIPPED | OUTPATIENT
Start: 2018-10-31 | End: 2019-03-15

## 2018-10-31 RX ORDER — CLINDAMYCIN PHOSPHATE 10 UG/ML
LOTION TOPICAL
COMMUNITY
Start: 2018-10-30

## 2018-10-31 RX ORDER — ATORVASTATIN CALCIUM 40 MG/1
TABLET, FILM COATED ORAL
Qty: 90 TABLET | Refills: 3 | Status: SHIPPED | OUTPATIENT
Start: 2018-10-31 | End: 2019-10-25

## 2018-10-31 RX ORDER — CITALOPRAM HYDROBROMIDE 40 MG/1
40 TABLET ORAL DAILY
Qty: 90 TABLET | Refills: 1 | Status: SHIPPED | OUTPATIENT
Start: 2018-10-31 | End: 2019-03-15

## 2018-10-31 NOTE — MR AVS SNAPSHOT
After Visit Summary   10/31/2018    Angelique Sharp    MRN: [id number]           Patient Information     Date Of Birth          1955        Visit Information        Provider Department      10/31/2018 8:00 AM Zuri William PA-C [facility]        Today's Diagnoses     Gastroesophageal reflux disease without esophagitis           Follow-ups after your visit        Who to contact     If you have questions or need follow up information about today's clinic visit or your schedule please contact [facility] directly at [phone].  Normal or non-critical lab and imaging results will be communicated to you by [employer], letter or phone within 4 business days after the clinic has received the results. If you do not hear from us within 7 days, please contact the clinic through [employer] or phone. If you have a critical or abnormal lab result, we will notify you by phone as soon as possible.  Submit refill requests through [employer] or call your pharmacy and they will forward the refill request to us. Please allow 3 business days for your refill to be completed.          Additional Information About Your Visit        MyChart Information     [employer] gives you secure access to your electronic health record. If you see a primary care provider, you can also send messages to your care team and make appointments. If you have questions, please call your primary care clinic.  If you do not have a primary care provider, please call [phone] and they will assist you.        Care EveryWhere ID     This is your Care EveryWhere ID. This could be used by other organizations to access your [place] medical records  [id number]        Your Vitals Were     Pulse Temperature Respirations Last Period BMI (Body Mass Index)       87 98.2  F (36.8  C) (Oral) 16 08/01/2007 36.73 kg/m2        Blood Pressure from Last 3 Encounters:   10/31/18 139/84   09/21/18 150/80   04/17/18 138/86     Weight from Last 3 Encounters:   10/31/18 209 lb (94.8 kg)   09/21/18 216 lb 6.4 oz (98.2 kg)   04/17/18 217 lb (98.4 kg)              Today, you had the following     No orders found for display       Primary Care Provider Office Phone # Fax #    Zuri ATUL William PA-C [phone] [address] [facility] [id number]        Equal Access to Services     JOSEFINA LOUIE : Hadii aad ku hadasho Soomaali, waaxda luqadaha, qaybta kaalmada adeegyada, waxay idiin hayaan adeeg kharash la'brittanyn . So [facility] [phone].    ATENCIÓN: Si habla español, tiene a cortez disposición servicios gratuitos de asistencia lingüística. [facility] [phone].    We comply with applicable federal civil rights laws and [place] laws. We do not discriminate on the basis of race, color, national origin, age, disability, sex, sexual orientation, or gender identity.            Thank you!     Thank you for choosing [facility]  for your care. Our goal is always to provide you with excellent care. Hearing back from our patients is one way we can continue to improve our services. Please take a few minutes to complete the written survey that you may receive in the mail after your visit with us. Thank you!             Your Updated Medication List - Protect others around you: Learn how to safely use, store and throw away your medicines at www.disposemymeds.org.          This list is accurate as of 10/31/18  8:05 AM.  Always use your most recent med list.                   Brand Name Dispense Instructions for use Diagnosis    aspirin 81 MG tablet     30 tablet    Take 1 tablet (81 mg) by mouth daily    Diabetes mellitus without complication (H)       atorvastatin 40 MG tablet    LIPITOR    30 tablet    TAKE 1 TABLET (40 MG) BY MOUTH DAILY    Hyperlipidemia LDL goal <100       blood glucose lancets standard    no brand specified    100 each    Use to test blood sugar 2 times daily or as directed.    Diabetes mellitus  without complication (H)       blood glucose lancing device     1 each    Lancing device to be used with lancets.    Diabetes mellitus without complication (H)       blood glucose monitoring meter device kit    no brand specified    1 kit    Use to test blood sugar 2 times daily or as directed.    Diabetes mellitus without complication (H)       blood glucose monitoring test strip    no brand specified    100 each    Use to test blood sugars 2 times daily or as directed    Diabetes mellitus without complication (H)       citalopram 40 MG tablet    celeXA    90 tablet    Take 1 tablet (40 mg) by mouth daily    Major depressive disorder, recurrent episode, mild (H)       clindamycin 1 % lotion    CLEOCIN T          losartan-hydrochlorothiazide 50-12.5 MG per tablet    HYZAAR    90 tablet    Take 1 tablet by mouth daily SEE PCP OCTOBER    HTN, goal below 140/90       metFORMIN 500 MG 24 hr tablet    GLUCOPHAGE-XR    90 tablet    TAKE 1 TABLET (500 MG) BY MOUTH DAILY (WITH DINNER)    Diabetes mellitus without complication (H)       Multi-vitamin Tabs tablet     100 tablet    Take 1 tablet by mouth daily.    Type 2 diabetes, HbA1c goal < 7% (H)       omeprazole 20 MG CR capsule    priLOSEC    30 capsule    TAKE 1 CAPSULE (20 MG) BY MOUTH DAILY    Gastroesophageal reflux disease without esophagitis       traZODone 50 MG tablet    DESYREL    60 tablet    Take 1-2 tablets ( mg) by mouth nightly as needed for sleep    Insomnia, unspecified type       triamcinolone 0.1 % cream    KENALOG     1 APPLICATION TWICE A DAY TOPICALLY TO THE ARMS AND LEGS FOR 2WEEKS AT A TIME.        VITAMIN B 12 PO      Take 1,000 mcg by mouth        VITAMIN D (CHOLECALCIFEROL) PO      Take 2,000 Units by mouth daily

## 2018-10-31 NOTE — PROGRESS NOTES
SUBJECTIVE:   Angelique Sharp is a 63 year old female who presents to clinic today for the following health issues:      Diabetes Follow-up      Patient is checking blood sugars: rarely.  Results range from 108 before breakfast, usually checking once a week    Diabetic concerns: None     Symptoms of hypoglycemia (low blood sugar): none     Paresthesias (numbness or burning in feet) or sores: No     Date of last diabetic eye exam: 2/2018-[facility] [place], [place]    Diabetes Management Resources    Hyperlipidemia Follow-Up      Rate your low fat/cholesterol diet?: good    Taking statin?  Yes, no muscle aches from statin    Other lipid medications/supplements?:  none    Hypertension Follow-up      Outpatient blood pressures are not being checked.    Low Salt Diet: no added salt    BP Readings from Last 2 Encounters:   09/21/18 150/80   04/17/18 138/86     Hemoglobin A1C (%)   Date Value   04/17/2018 6.4 (H)   10/10/2017 6.3 (H)     LDL Cholesterol Calculated (mg/dL)   Date Value   10/10/2017 78   08/15/2016 101 (H)       Amount of exercise or physical activity: 2-3 days/week for an average of 15-30 minutes    Problems taking medications regularly: No    Medication side effects: none    Diet: regular (no restrictions)    Depression Followup    Status since last visit: Stable     See PHQ-9 for current symptoms.  Other associated symptoms: None    Complicating factors:   Significant life event:  No   Current substance abuse:  None  Anxiety or Panic symptoms:  No    PHQ 3/21/2017 10/10/2017 4/17/2018   PHQ-9 Total Score 13 21 3   Q9: Suicide Ideation Several days Several days Not at all     In the past two weeks have you had thoughts of suicide or self-harm?  No.    Do you have concerns about your personal safety or the safety of others?   No  PHQ-9  English  PHQ-9   Any Language  Suicide Assessment Five-step Evaluation and Treatment (SAFE-T)         Acute Illness   Acute illness concerns:  Cough  Onset: X 11 days    Fever: no    Chills/Sweats: YES    Headache (location?): no     Sinus Pressure:no    Conjunctivitis:  no    Ear Pain: YES: left    Rhinorrhea: YES    Congestion: no     Sore Throat: YES- minor     Cough: YES-productive of unknown sputum    Wheeze: no    Decreased Appetite: no    Nausea: no    Vomiting: no    Diarrhea:  no    Dysuria/Freq.: no    Fatigue/Achiness: no    Sick/Strep Exposure: no     Therapies Tried and outcome: Cough Drops, NyQuil but hasn't taken for about 1 week.          Problem list and histories reviewed & adjusted, as indicated.  Additional history: as documented    Patient Active Problem List   Diagnosis     Pain in limb     Other hammer toe (acquired)     Hallux valgus, acquired     Mild major depression (H)     Hypertension     HTN, goal below 140/90     GERD (gastroesophageal reflux disease)     Hyperlipidemia LDL goal <100     Vitamin D deficiency     Bloody diarrhea     Insomnia     Obesity     Diabetes mellitus without complication (H)     Past Surgical History:   Procedure Laterality Date     C NONSPECIFIC PROCEDURE      Lt. leg vein stripping     C NONSPECIFIC PROCEDURE       x 1     C NONSPECIFIC PROCEDURE      vaginal delivery x 1     COLONOSCOPY       COLONOSCOPY  2014    Procedure: COLONOSCOPY;  COLONOSCOPY    ;  Surgeon: Stewart Torres MD;  Location:  GI     VASCULAR SURGERY         Social History   Substance Use Topics     Smoking status: Former Smoker     Years: 20.00     Smokeless tobacco: Never Used      Comment: 1 pack every 2 weeks---would like to quit     Alcohol use Yes      Comment: rarely     Family History   Problem Relation Age of Onset     Eye Disorder Maternal Grandmother      glaucoma     Diabetes Maternal Grandmother      Breast Cancer Maternal Aunt            Reviewed and updated as needed this visit by clinical staff  Tobacco  Allergies  Meds  Med Hx  Surg Hx  Fam Hx  Soc Hx      Reviewed and updated as needed  this visit by Provider         ROS:  Constitutional, HEENT, cardiovascular, pulmonary, GI, , musculoskeletal, neuro, skin, endocrine and psych systems are negative, except as otherwise noted.    OBJECTIVE:     /84 (BP Location: Right arm, Patient Position: Chair, Cuff Size: Adult Large)  Pulse 87  Temp 98.2  F (36.8  C) (Oral)  Resp 16  Wt 209 lb (94.8 kg)  LMP 08/01/2007  BMI 36.73 kg/m2  Body mass index is 36.73 kg/(m^2).  GENERAL APPEARANCE: healthy, alert and no distress  HENT: ear canals and TM's normal and nose and mouth without ulcers or lesions  RESP: lungs clear to auscultation - no rales, rhonchi or wheezes  CV: regular rates and rhythm, normal S1 S2, no S3 or S4 and no murmur, click or rub  MS: extremities normal- no gross deformities noted  NEURO: Normal strength and tone, mentation intact and speech normal  PSYCH: mentation appears normal and affect normal/bright        ASSESSMENT/PLAN:             1. Diabetes mellitus without complication (H)  Await labs. Recheck 3-6 months.   - Hemoglobin A1c  - Albumin Random Urine Quantitative with Creat Ratio  - metFORMIN (GLUCOPHAGE-XR) 500 MG 24 hr tablet; TAKE 1 TABLET (500 MG) BY MOUTH DAILY (WITH DINNER)  Dispense: 90 tablet; Refill: 1    2. Major depressive disorder, recurrent episode, mild (H)  Stable. Recheck 6 months  - citalopram (CELEXA) 40 MG tablet; Take 1 tablet (40 mg) by mouth daily  Dispense: 90 tablet; Refill: 1    3. HTN, goal below 140/90  Stable.   - losartan-hydrochlorothiazide (HYZAAR) 50-12.5 MG per tablet; Take 1 tablet by mouth daily  Dispense: 90 tablet; Refill: 1    4. Hyperlipidemia LDL goal <100  Stable.   - Lipid panel reflex to direct LDL Fasting  - atorvastatin (LIPITOR) 40 MG tablet; TAKE 1 TABLET (40 MG) BY MOUTH DAILY  Dispense: 90 tablet; Refill: 3    5. Gastroesophageal reflux disease without esophagitis  Stable.   - omeprazole (PRILOSEC) 20 MG CR capsule; TAKE 1 CAPSULE (20 MG) BY MOUTH DAILY  Dispense: 90  capsule; Refill: 3    6. Insomnia, unspecified type  Stable.   - traZODone (DESYREL) 50 MG tablet; Take 1-2 tablets ( mg) by mouth nightly as needed for sleep  Dispense: 180 tablet; Refill: 3    7. Cough  Will treat given symptoms   - azithromycin (ZITHROMAX) 250 MG tablet; Two tablets first day, then one tablet daily for four days.  Dispense: 6 tablet; Refill: 0    8. Tdap given    Zuri William PA-C  [facility]

## 2018-11-10 ENCOUNTER — HEALTH MAINTENANCE LETTER (OUTPATIENT)
Age: 63
End: 2018-11-10

## 2018-11-13 ENCOUNTER — TELEPHONE (OUTPATIENT)
Dept: FAMILY MEDICINE | Facility: CLINIC | Age: 63
End: 2018-11-13

## 2018-11-13 NOTE — TELEPHONE ENCOUNTER
Panel Management Review      Patient has the following on her problem list:     Depression / Dysthymia review    Measure:  Needs PHQ-9 score of 4 or less during index window.  Administer PHQ-9 and if score is 5 or more, send encounter to provider for next steps.      PHQ-9 SCORE 3/21/2017 10/10/2017 4/17/2018   Total Score - - -   Total Score 13 21 3       If PHQ-9 recheck is 5 or more, route to provider for next steps.    Patient is due for:  PHQ9    Diabetes    ASA: Passed    Last A1C  Lab Results   Component Value Date    A1C 6.2 10/31/2018    A1C 6.4 04/17/2018    A1C 6.3 10/10/2017    A1C 6.6 03/21/2017    A1C 6.4 03/24/2016     A1C tested: Passed    Last LDL:    Lab Results   Component Value Date    CHOL 207 10/31/2018     Lab Results   Component Value Date    HDL 96 10/31/2018     Lab Results   Component Value Date    LDL 78 10/31/2018     Lab Results   Component Value Date    TRIG 165 10/31/2018     Lab Results   Component Value Date    CHOLHDLRATIO 2.0 07/01/2015     Lab Results   Component Value Date    NHDL 111 10/31/2018       Is the patient on a Statin? YES             Is the patient on Aspirin? YES    Medications     HMG CoA Reductase Inhibitors    atorvastatin (LIPITOR) 40 MG tablet    Salicylates    aspirin 81 MG tablet          Last three blood pressure readings:  BP Readings from Last 3 Encounters:   10/31/18 139/84   09/21/18 150/80   04/17/18 138/86       Date of last diabetes office visit: 10/31/2018     Tobacco History:     History   Smoking Status     Former Smoker     Years: 20.00   Smokeless Tobacco     Never Used     Comment: 1 pack every 2 weeks---would like to quit         Hypertension   Last three blood pressure readings:  BP Readings from Last 3 Encounters:   10/31/18 139/84   09/21/18 150/80   04/17/18 138/86     Blood pressure: Passed    HTN Guidelines:  Age 18-59 BP range:  Less than 140/90  Age 60-85 with Diabetes:  Less than 140/90  Age 60-85 without Diabetes:  less than 150/90       Composite cancer screening  Chart review shows that this patient is due/due soon for the following Pap Smear  Summary:    Patient is due/failing the following:   PAP    Action needed:   Patient needs office visit for PAP.    Type of outreach:    Panel Pool, please contact patient    Questions for provider review:    None                                                                                                                                    Shimon Fuentes MA       Chart routed to Care Team .

## 2019-02-25 ENCOUNTER — TRANSFERRED RECORDS (OUTPATIENT)
Dept: HEALTH INFORMATION MANAGEMENT | Facility: CLINIC | Age: 64
End: 2019-02-25

## 2019-02-25 LAB — RETINOPATHY: NEGATIVE

## 2019-03-15 ENCOUNTER — OFFICE VISIT (OUTPATIENT)
Dept: FAMILY MEDICINE | Facility: CLINIC | Age: 64
End: 2019-03-15
Payer: COMMERCIAL

## 2019-03-15 VITALS
TEMPERATURE: 98.3 F | BODY MASS INDEX: 37.56 KG/M2 | OXYGEN SATURATION: 97 % | HEART RATE: 76 BPM | WEIGHT: 212 LBS | HEIGHT: 63 IN | DIASTOLIC BLOOD PRESSURE: 78 MMHG | SYSTOLIC BLOOD PRESSURE: 136 MMHG | RESPIRATION RATE: 16 BRPM

## 2019-03-15 DIAGNOSIS — F33.0 MAJOR DEPRESSIVE DISORDER, RECURRENT EPISODE, MILD (H): ICD-10-CM

## 2019-03-15 DIAGNOSIS — I10 HTN, GOAL BELOW 140/90: ICD-10-CM

## 2019-03-15 DIAGNOSIS — E11.9 DIABETES MELLITUS WITHOUT COMPLICATION (H): ICD-10-CM

## 2019-03-15 DIAGNOSIS — J40 BRONCHITIS: Primary | ICD-10-CM

## 2019-03-15 DIAGNOSIS — E66.01 MORBID OBESITY (H): ICD-10-CM

## 2019-03-15 PROCEDURE — 99214 OFFICE O/P EST MOD 30 MIN: CPT | Performed by: FAMILY MEDICINE

## 2019-03-15 RX ORDER — CITALOPRAM HYDROBROMIDE 40 MG/1
40 TABLET ORAL DAILY
Qty: 90 TABLET | Refills: 1 | Status: SHIPPED | OUTPATIENT
Start: 2019-03-15 | End: 2019-11-03

## 2019-03-15 RX ORDER — METFORMIN HCL 500 MG
TABLET, EXTENDED RELEASE 24 HR ORAL
Qty: 90 TABLET | Refills: 1 | Status: SHIPPED | OUTPATIENT
Start: 2019-03-15 | End: 2019-11-03

## 2019-03-15 RX ORDER — LOSARTAN POTASSIUM AND HYDROCHLOROTHIAZIDE 12.5; 5 MG/1; MG/1
1 TABLET ORAL DAILY
Qty: 90 TABLET | Refills: 1 | Status: SHIPPED | OUTPATIENT
Start: 2019-03-15 | End: 2019-10-02

## 2019-03-15 RX ORDER — CEFDINIR 300 MG/1
300 CAPSULE ORAL 2 TIMES DAILY
Qty: 14 CAPSULE | Refills: 0 | Status: SHIPPED | OUTPATIENT
Start: 2019-03-15 | End: 2019-11-08

## 2019-03-15 ASSESSMENT — ANXIETY QUESTIONNAIRES
1. FEELING NERVOUS, ANXIOUS, OR ON EDGE: NOT AT ALL
6. BECOMING EASILY ANNOYED OR IRRITABLE: SEVERAL DAYS
IF YOU CHECKED OFF ANY PROBLEMS ON THIS QUESTIONNAIRE, HOW DIFFICULT HAVE THESE PROBLEMS MADE IT FOR YOU TO DO YOUR WORK, TAKE CARE OF THINGS AT HOME, OR GET ALONG WITH OTHER PEOPLE: NOT DIFFICULT AT ALL
3. WORRYING TOO MUCH ABOUT DIFFERENT THINGS: NOT AT ALL
2. NOT BEING ABLE TO STOP OR CONTROL WORRYING: NOT AT ALL
GAD7 TOTAL SCORE: 1
7. FEELING AFRAID AS IF SOMETHING AWFUL MIGHT HAPPEN: NOT AT ALL
5. BEING SO RESTLESS THAT IT IS HARD TO SIT STILL: NOT AT ALL

## 2019-03-15 ASSESSMENT — MIFFLIN-ST. JEOR: SCORE: 1499.72

## 2019-03-15 ASSESSMENT — PATIENT HEALTH QUESTIONNAIRE - PHQ9
5. POOR APPETITE OR OVEREATING: NOT AT ALL
SUM OF ALL RESPONSES TO PHQ QUESTIONS 1-9: 5

## 2019-03-15 NOTE — PROGRESS NOTES
SUBJECTIVE:   Angelique Sharp is a 63 year old female who presents to clinic today for the following health issues:      Acute Illness   Acute illness concerns: cough  Onset: 5 days    Fever: no    Chills/Sweats: YES    Headache (location?): YES    Sinus Pressure:no    Conjunctivitis:  YES: bilateral    Ear Pain: no    Rhinorrhea: YES    Congestion: YES    Sore Throat: YES     Cough: YES-non-productive, with shortness of breath    Wheeze: YES    Decreased Appetite: YES    Nausea: no    Vomiting: no    Diarrhea:  YES    Dysuria/Freq.: no    Fatigue/Achiness: YES    Sick/Strep Exposure: no     Therapies Tried and outcome: OTC medicine.      Pt stopped smoking 25 years ago.  No hx of asthma.      Problem list and histories reviewed & adjusted, as indicated.  Additional history: as documented    Patient Active Problem List   Diagnosis     Pain in limb     Other hammer toe (acquired)     Hallux valgus, acquired     Mild major depression (H)     Hypertension     HTN, goal below 140/90     GERD (gastroesophageal reflux disease)     Hyperlipidemia LDL goal <100     Vitamin D deficiency     Bloody diarrhea     Insomnia     Obesity     Diabetes mellitus without complication (H)     Obesity (BMI 35.0-39.9) with comorbidity (H)     Past Surgical History:   Procedure Laterality Date     C NONSPECIFIC PROCEDURE      Lt. leg vein stripping     C NONSPECIFIC PROCEDURE       x 1     C NONSPECIFIC PROCEDURE      vaginal delivery x 1     COLONOSCOPY       COLONOSCOPY  2014    Procedure: COLONOSCOPY;  COLONOSCOPY    ;  Surgeon: Stewart Torres MD;  Location:  GI     VASCULAR SURGERY         Social History     Tobacco Use     Smoking status: Former Smoker     Years: 20.00     Smokeless tobacco: Never Used     Tobacco comment: 1 pack every 2 weeks---would like to quit   Substance Use Topics     Alcohol use: Yes     Comment: rarely     Family History   Problem Relation Age of Onset     Eye Disorder Maternal  Grandmother         glaucoma     Diabetes Maternal Grandmother      Breast Cancer Maternal Aunt          Current Outpatient Medications   Medication Sig Dispense Refill     aspirin 81 MG tablet Take 1 tablet (81 mg) by mouth daily 30 tablet      atorvastatin (LIPITOR) 40 MG tablet TAKE 1 TABLET (40 MG) BY MOUTH DAILY 90 tablet 3     blood glucose (NO BRAND SPECIFIED) lancets standard Use to test blood sugar 2 times daily or as directed. 100 each 1     blood glucose (ONE TOUCH DELICA) lancing device Lancing device to be used with lancets. 1 each 0     blood glucose monitoring (NO BRAND SPECIFIED) meter device kit Use to test blood sugar 2 times daily or as directed. 1 kit 0     blood glucose monitoring (NO BRAND SPECIFIED) test strip Use to test blood sugars 2 times daily or as directed 100 each 3     cefdinir (OMNICEF) 300 MG capsule Take 1 capsule (300 mg) by mouth 2 times daily 14 capsule 0     citalopram (CELEXA) 40 MG tablet Take 1 tablet (40 mg) by mouth daily 90 tablet 1     clindamycin (CLEOCIN T) 1 % lotion        Cyanocobalamin (VITAMIN B 12 PO) Take 1,000 mcg by mouth       losartan-hydrochlorothiazide (HYZAAR) 50-12.5 MG tablet Take 1 tablet by mouth daily 90 tablet 1     metFORMIN (GLUCOPHAGE-XR) 500 MG 24 hr tablet TAKE 1 TABLET (500 MG) BY MOUTH DAILY (WITH DINNER) 90 tablet 1     multivitamin, therapeutic with minerals (MULTI-VITAMIN) TABS Take 1 tablet by mouth daily. 100 tablet 3     omeprazole (PRILOSEC) 20 MG CR capsule TAKE 1 CAPSULE (20 MG) BY MOUTH DAILY 90 capsule 3     traZODone (DESYREL) 50 MG tablet Take 1-2 tablets ( mg) by mouth nightly as needed for sleep 180 tablet 3     triamcinolone (KENALOG) 0.1 % cream 1 APPLICATION TWICE A DAY TOPICALLY TO THE ARMS AND LEGS FOR 2WEEKS AT A TIME.  1     VITAMIN D, CHOLECALCIFEROL, PO Take 2,000 Units by mouth daily         Reviewed and updated as needed this visit by clinical staff  Tobacco  Allergies  Meds  Med Hx  Surg Hx  Fam Hx  Soc  "Hx      Reviewed and updated as needed this visit by Provider         ROS:  CONSTITUTIONAL: NEGATIVE for fever, chills, change in weight  CV: NEGATIVE for chest pain, palpitations or peripheral edema    OBJECTIVE:     /78 (BP Location: Right arm, Patient Position: Chair, Cuff Size: Adult Large)   Pulse 76   Temp 98.3  F (36.8  C) (Oral)   Resp 16   Ht 1.607 m (5' 3.25\")   Wt 96.2 kg (212 lb)   LMP 08/01/2007   SpO2 97%   BMI 37.26 kg/m    Body mass index is 37.26 kg/m .   Head: Normocephalic, atraumatic.  Eyes: Conjunctiva clear, non icteric. PERRLA.  Ears: External ears nl, TM is nl   Nose: Septum midline, nasal mucosa congested. No discharge.  There is no tenderness over the maxillary sinuses, there is no tenderness over the frontal sinuses  Mouth / Throat: Normal dentition.  No oral lesions. Pharynx mild erythematous, tonsils no exudate/hypertrophy.  Neck: Supple, no enlarged LN, trachea midline.  LUNGS:  CTA B/L, no wheezing or crackles.  CVS : RRR, no murmur, no rub.            ASSESSMENT/PLAN:             1. Major depressive disorder, recurrent episode, mild (H)  Continue on   - citalopram (CELEXA) 40 MG tablet; Take 1 tablet (40 mg) by mouth daily  Dispense: 90 tablet; Refill: 1    2. HTN, goal below 140/90  Conterolled, continue on   - losartan-hydrochlorothiazide (HYZAAR) 50-12.5 MG tablet; Take 1 tablet by mouth daily  Dispense: 90 tablet; Refill: 1    3. Diabetes mellitus without complication (H)  Refill given  - metFORMIN (GLUCOPHAGE-XR) 500 MG 24 hr tablet; TAKE 1 TABLET (500 MG) BY MOUTH DAILY (WITH DINNER)  Dispense: 90 tablet; Refill: 1  Advised to follow up with her primary in regards to her medications.    4. Bronchitis  Advised about rest, OTC medications for symptoms, drink warm liquids, and may use humidifier at night time to improve the cough.  I don't recommend any abx at this time, but if her symptoms are not improving, then pt may start on     - cefdinir (OMNICEF) 300 MG " capsule; Take 1 capsule (300 mg) by mouth 2 times daily  Dispense: 14 capsule; Refill: 0    5. Morbid obesity (H)      Follow up in 5 days if symptoms persist, sooner if symptoms worsen or new ones develops, pt may contact us over the phone for any questions or concerns.        Jass Cagle MD  [facility]

## 2019-03-16 ASSESSMENT — ANXIETY QUESTIONNAIRES: GAD7 TOTAL SCORE: 1

## 2019-05-22 ENCOUNTER — OFFICE VISIT (OUTPATIENT)
Dept: FAMILY MEDICINE | Facility: CLINIC | Age: 64
End: 2019-05-22
Payer: COMMERCIAL

## 2019-05-22 VITALS
BODY MASS INDEX: 36.73 KG/M2 | RESPIRATION RATE: 14 BRPM | TEMPERATURE: 98.5 F | HEART RATE: 78 BPM | WEIGHT: 209 LBS | DIASTOLIC BLOOD PRESSURE: 86 MMHG | SYSTOLIC BLOOD PRESSURE: 138 MMHG | OXYGEN SATURATION: 94 %

## 2019-05-22 DIAGNOSIS — E11.9 DIABETES MELLITUS WITHOUT COMPLICATION (H): Primary | ICD-10-CM

## 2019-05-22 DIAGNOSIS — H66.90 ACUTE OTITIS MEDIA, UNSPECIFIED OTITIS MEDIA TYPE: ICD-10-CM

## 2019-05-22 DIAGNOSIS — R07.0 THROAT PAIN: ICD-10-CM

## 2019-05-22 DIAGNOSIS — R05.9 COUGH: ICD-10-CM

## 2019-05-22 LAB
ALBUMIN SERPL-MCNC: 3.3 G/DL (ref 3.4–5)
ALP SERPL-CCNC: 102 U/L (ref 40–150)
ALT SERPL W P-5'-P-CCNC: 18 U/L (ref 0–50)
ANION GAP SERPL CALCULATED.3IONS-SCNC: 9 MMOL/L (ref 3–14)
AST SERPL W P-5'-P-CCNC: 19 U/L (ref 0–45)
BILIRUB SERPL-MCNC: 0.4 MG/DL (ref 0.2–1.3)
BUN SERPL-MCNC: 14 MG/DL (ref 7–30)
CALCIUM SERPL-MCNC: 9.7 MG/DL (ref 8.5–10.1)
CHLORIDE SERPL-SCNC: 105 MMOL/L (ref 94–109)
CO2 SERPL-SCNC: 25 MMOL/L (ref 20–32)
CREAT SERPL-MCNC: 0.75 MG/DL (ref 0.52–1.04)
DEPRECATED S PYO AG THROAT QL EIA: NORMAL
GFR SERPL CREATININE-BSD FRML MDRD: 86 ML/MIN/{1.73_M2}
GLUCOSE SERPL-MCNC: 132 MG/DL (ref 70–99)
HBA1C MFR BLD: 6.2 % (ref 0–5.6)
POTASSIUM SERPL-SCNC: 4.1 MMOL/L (ref 3.4–5.3)
PROT SERPL-MCNC: 7.5 G/DL (ref 6.8–8.8)
SODIUM SERPL-SCNC: 139 MMOL/L (ref 133–144)
SPECIMEN SOURCE: NORMAL
TSH SERPL DL<=0.005 MIU/L-ACNC: 0.9 MU/L (ref 0.4–4)

## 2019-05-22 PROCEDURE — 80053 COMPREHEN METABOLIC PANEL: CPT | Performed by: PHYSICIAN ASSISTANT

## 2019-05-22 PROCEDURE — 87081 CULTURE SCREEN ONLY: CPT | Performed by: PHYSICIAN ASSISTANT

## 2019-05-22 PROCEDURE — 36415 COLL VENOUS BLD VENIPUNCTURE: CPT | Performed by: PHYSICIAN ASSISTANT

## 2019-05-22 PROCEDURE — 83036 HEMOGLOBIN GLYCOSYLATED A1C: CPT | Performed by: PHYSICIAN ASSISTANT

## 2019-05-22 PROCEDURE — 87880 STREP A ASSAY W/OPTIC: CPT | Performed by: PHYSICIAN ASSISTANT

## 2019-05-22 PROCEDURE — 99214 OFFICE O/P EST MOD 30 MIN: CPT | Performed by: PHYSICIAN ASSISTANT

## 2019-05-22 PROCEDURE — 84443 ASSAY THYROID STIM HORMONE: CPT | Performed by: PHYSICIAN ASSISTANT

## 2019-05-22 RX ORDER — AZITHROMYCIN 250 MG/1
TABLET, FILM COATED ORAL
Qty: 6 TABLET | Refills: 1 | Status: SHIPPED | OUTPATIENT
Start: 2019-05-22 | End: 2019-11-06

## 2019-05-22 RX ORDER — CODEINE PHOSPHATE AND GUAIFENESIN 10; 100 MG/5ML; MG/5ML
1-2 SOLUTION ORAL EVERY 6 HOURS PRN
Qty: 180 ML | Refills: 0 | Status: SHIPPED | OUTPATIENT
Start: 2019-05-22 | End: 2019-11-08

## 2019-05-22 SDOH — HEALTH STABILITY: PHYSICAL HEALTH: ON AVERAGE, HOW MANY MINUTES DO YOU ENGAGE IN EXERCISE AT THIS LEVEL?: 20 MIN

## 2019-05-22 SDOH — ECONOMIC STABILITY: FOOD INSECURITY: WITHIN THE PAST 12 MONTHS, THE FOOD YOU BOUGHT JUST DIDN'T LAST AND YOU DIDN'T HAVE MONEY TO GET MORE.: NEVER TRUE

## 2019-05-22 SDOH — ECONOMIC STABILITY: FOOD INSECURITY: WITHIN THE PAST 12 MONTHS, YOU WORRIED THAT YOUR FOOD WOULD RUN OUT BEFORE YOU GOT MONEY TO BUY MORE.: NEVER TRUE

## 2019-05-22 SDOH — ECONOMIC STABILITY: TRANSPORTATION INSECURITY
IN THE PAST 12 MONTHS, HAS THE LACK OF TRANSPORTATION KEPT YOU FROM MEDICAL APPOINTMENTS OR FROM GETTING MEDICATIONS?: NO

## 2019-05-22 SDOH — SOCIAL STABILITY: SOCIAL NETWORK: HOW OFTEN DO YOU GET TOGETHER WITH FRIENDS OR RELATIVES?: ONCE A WEEK

## 2019-05-22 SDOH — SOCIAL STABILITY: SOCIAL NETWORK: ARE YOU MARRIED, WIDOWED, DIVORCED, SEPARATED, NEVER MARRIED, OR LIVING WITH A PARTNER?: MARRIED

## 2019-05-22 SDOH — SOCIAL STABILITY: SOCIAL NETWORK
DO YOU BELONG TO ANY CLUBS OR ORGANIZATIONS SUCH AS CHURCH GROUPS UNIONS, FRATERNAL OR ATHLETIC GROUPS, OR SCHOOL GROUPS?: NO

## 2019-05-22 SDOH — HEALTH STABILITY: MENTAL HEALTH: HOW OFTEN DO YOU HAVE 6 OR MORE DRINKS ON ONE OCCASION?: NEVER

## 2019-05-22 SDOH — HEALTH STABILITY: MENTAL HEALTH: HOW OFTEN DO YOU HAVE A DRINK CONTAINING ALCOHOL?: 2-3 TIMES A WEEK

## 2019-05-22 SDOH — SOCIAL STABILITY: SOCIAL NETWORK
IN A TYPICAL WEEK, HOW MANY TIMES DO YOU TALK ON THE PHONE WITH FAMILY, FRIENDS, OR NEIGHBORS?: MORE THAN THREE TIMES A WEEK

## 2019-05-22 SDOH — ECONOMIC STABILITY: INCOME INSECURITY: HOW HARD IS IT FOR YOU TO PAY FOR THE VERY BASICS LIKE FOOD, HOUSING, MEDICAL CARE, AND HEATING?: NOT HARD AT ALL

## 2019-05-22 SDOH — SOCIAL STABILITY: SOCIAL NETWORK: HOW OFTEN DO YOU ATTENT MEETINGS OF THE CLUB OR ORGANIZATION YOU BELONG TO?: NEVER

## 2019-05-22 SDOH — HEALTH STABILITY: MENTAL HEALTH
STRESS IS WHEN SOMEONE FEELS TENSE, NERVOUS, ANXIOUS, OR CAN'T SLEEP AT NIGHT BECAUSE THEIR MIND IS TROUBLED. HOW STRESSED ARE YOU?: ONLY A LITTLE

## 2019-05-22 SDOH — HEALTH STABILITY: PHYSICAL HEALTH: ON AVERAGE, HOW MANY DAYS PER WEEK DO YOU ENGAGE IN MODERATE TO STRENUOUS EXERCISE (LIKE A BRISK WALK)?: 5 DAYS

## 2019-05-22 SDOH — ECONOMIC STABILITY: TRANSPORTATION INSECURITY
IN THE PAST 12 MONTHS, HAS LACK OF TRANSPORTATION KEPT YOU FROM MEETINGS, WORK, OR FROM GETTING THINGS NEEDED FOR DAILY LIVING?: NO

## 2019-05-22 SDOH — SOCIAL STABILITY: SOCIAL NETWORK: HOW OFTEN DO YOU ATTEND CHURCH OR RELIGIOUS SERVICES?: MORE THAN 4 TIMES PER YEAR

## 2019-05-22 ASSESSMENT — ENCOUNTER SYMPTOMS: SORE THROAT: 1

## 2019-05-22 NOTE — PROGRESS NOTES
Subjective     Angelique Sharp is a 63 year old female who presents to clinic today for the following health issues:    Pharyngitis      History of Present Illness     She eats 2-3 servings of fruits and vegetables daily.She consumes 0 sweetened beverage(s) daily.  She is taking medications regularly.     Acute Illness   Acute illness concerns: sinus, cough, ST  Onset: 1 week    Fever: no    Chills/Sweats: no    Headache (location?): YES    Sinus Pressure:YES    Conjunctivitis:  no    Ear Pain: YES: bilateral    Rhinorrhea: YES    Congestion: YES    Sore Throat: YES     Cough: YES-productive of clear sputum    Wheeze: no    Decreased Appetite: no    Nausea: no    Vomiting: no    Diarrhea:  no    Dysuria/Freq.: no    Fatigue/Achiness: no    Sick/Strep Exposure: no     Therapies Tried and outcome:     Patient Active Problem List   Diagnosis     Pain in limb     Other hammer toe (acquired)     Hallux valgus, acquired     Mild major depression (H)     Hypertension     HTN, goal below 140/90     GERD (gastroesophageal reflux disease)     Hyperlipidemia LDL goal <100     Vitamin D deficiency     Bloody diarrhea     Insomnia     Obesity     Diabetes mellitus without complication (H)     Obesity (BMI 35.0-39.9) with comorbidity (H)     Past Surgical History:   Procedure Laterality Date     C NONSPECIFIC PROCEDURE      Lt. leg vein stripping     C NONSPECIFIC PROCEDURE       x 1     C NONSPECIFIC PROCEDURE      vaginal delivery x 1     COLONOSCOPY       COLONOSCOPY  2014    Procedure: COLONOSCOPY;  COLONOSCOPY    ;  Surgeon: Stewart Torres MD;  Location:  GI     VASCULAR SURGERY         Social History     Tobacco Use     Smoking status: Former Smoker     Years: 20.00     Smokeless tobacco: Never Used     Tobacco comment: 1 pack every 2 weeks---would like to quit   Substance Use Topics     Alcohol use: Yes     Frequency: 2-3 times a week     Binge frequency: Never     Comment: rarely     Family History  "  Problem Relation Age of Onset     Eye Disorder Maternal Grandmother         glaucoma     Diabetes Maternal Grandmother      Breast Cancer Maternal Aunt              Reviewed and updated as needed this visit by Provider         Review of Systems   HENT: Positive for sore throat.       ROS COMP: Constitutional, HEENT, cardiovascular, pulmonary, gi and gu systems are negative, except as otherwise noted.      Objective    LMP 08/01/2007   There is no height or weight on file to calculate BMI.  Physical Exam   GENERAL APPEARANCE: healthy, alert and no distress  HENT: TM congested/bulging bilateral, rhinorrhea yellow and oropharynx clear  RESP: lungs clear to auscultation - no rales, rhonchi or wheezes  CV: regular rates and rhythm, normal S1 S2, no S3 or S4 and no murmur, click or rub  PSYCH: mentation appears normal and affect normal/bright    Diagnostic Test Results:  Strep screen - Negative        Assessment & Plan     1. Diabetes mellitus without complication (H)  Due for labs.   - Hemoglobin A1c  - TSH with free T4 reflex  - Comprehensive metabolic panel    2. Throat pain    - Strep, Rapid Screen  - azithromycin (ZITHROMAX) 250 MG tablet; Two tablets first day, then one tablet daily for four days.  Dispense: 6 tablet; Refill: 1  - Beta strep group A culture    3. Cough    - guaiFENesin-codeine (ROBITUSSIN AC) 100-10 MG/5ML solution; Take 5-10 mLs by mouth every 6 hours as needed for cough  Dispense: 180 mL; Refill: 0    4. Acute otitis media, unspecified otitis media type  zpak given.        BMI:   Estimated body mass index is 36.73 kg/m  as calculated from the following:    Height as of 3/15/19: 1.607 m (5' 3.25\").    Weight as of this encounter: 94.8 kg (209 lb).   Weight management plan: Discussed healthy diet and exercise guidelines        See Patient Instructions    Return in about 6 months (around 11/22/2019) for Medication Check, Diabetes Recheck.    Zuri William PA-C  [facility]

## 2019-05-23 LAB
BACTERIA SPEC CULT: NORMAL
SPECIMEN SOURCE: NORMAL

## 2019-05-24 ENCOUNTER — MYC MEDICAL ADVICE (OUTPATIENT)
Dept: FAMILY MEDICINE | Facility: CLINIC | Age: 64
End: 2019-05-24

## 2019-05-24 DIAGNOSIS — H65.193 OTHER ACUTE NONSUPPURATIVE OTITIS MEDIA OF BOTH EARS, RECURRENCE NOT SPECIFIED: Primary | ICD-10-CM

## 2019-06-04 DIAGNOSIS — E11.9 DIABETES MELLITUS WITHOUT COMPLICATION (H): ICD-10-CM

## 2019-06-04 NOTE — TELEPHONE ENCOUNTER
"Last Written Prescription Date:  10/10/17  Last Fill Quantity: 100 each,  # refills: 3   Last office visit: 5/22/2019 with prescribing provider:  LEONOR William   Future Office Visit:      Requested Prescriptions   Pending Prescriptions Disp Refills     blood glucose (ONETOUCH ULTRA) test strip [Pharmacy Med Name: ONE TOUCH ULTRA TEST STRIPS] 100 strip 1     Sig: USE TO TEST BLOOD SUGARS 2 TIMES DAILY OR AS DIRECTED       Diabetic Supplies Protocol Passed - 6/4/2019 11:11 AM        Passed - Medication is active on med list        Passed - Patient is 18 years of age or older        Passed - Recent (6 mo) or future (30 days) visit within the authorizing provider's specialty     Patient had office visit in the last 6 months or has a visit in the next 30 days with authorizing provider.  See \"Patient Info\" tab in inbasket, or \"Choose Columns\" in Meds & Orders section of the refill encounter.              "

## 2019-06-11 ENCOUNTER — OFFICE VISIT (OUTPATIENT)
Dept: FAMILY MEDICINE | Facility: CLINIC | Age: 64
End: 2019-06-11
Payer: COMMERCIAL

## 2019-06-11 VITALS
HEART RATE: 96 BPM | WEIGHT: 207 LBS | TEMPERATURE: 98.8 F | BODY MASS INDEX: 36.38 KG/M2 | OXYGEN SATURATION: 96 % | DIASTOLIC BLOOD PRESSURE: 82 MMHG | SYSTOLIC BLOOD PRESSURE: 136 MMHG | RESPIRATION RATE: 16 BRPM

## 2019-06-11 DIAGNOSIS — H69.93 DYSFUNCTION OF BOTH EUSTACHIAN TUBES: Primary | ICD-10-CM

## 2019-06-11 PROCEDURE — 99213 OFFICE O/P EST LOW 20 MIN: CPT | Performed by: PHYSICIAN ASSISTANT

## 2019-06-11 RX ORDER — FLUTICASONE PROPIONATE 50 MCG
1 SPRAY, SUSPENSION (ML) NASAL DAILY
Qty: 1 ML | Refills: 0 | Status: SHIPPED | OUTPATIENT
Start: 2019-06-11 | End: 2019-11-06

## 2019-06-11 RX ORDER — PSEUDOEPHEDRINE HCL 120 MG/1
120 TABLET, FILM COATED, EXTENDED RELEASE ORAL EVERY 12 HOURS
Qty: 14 TABLET | Refills: 0 | Status: SHIPPED | OUTPATIENT
Start: 2019-06-11 | End: 2019-11-06

## 2019-06-11 NOTE — PROGRESS NOTES
Subjective     Angelique Sharp is a 63 year old female who presents to clinic today for the following health issues:    HPI   Acute Illness   Acute illness concerns: Sore throat and Ear pain   Onset:  Ongoing since february     Fever: no     Chills/Sweats: no     Headache (location?): YES    Sinus Pressure: Yes    Conjunctivitis: no     Ear Pain: YES: right    Rhinorrhea: no     Congestion: no     Sore Throat: YES     Cough: YES improved significantly after completing the antibiotic course.:     Wheeze: no     Decreased Appetite: no     Nausea: no    Vomiting: no    Diarrhea:  no    Dysuria/Freq.: no    Fatigue/Achiness: YES    Sick/Strep Exposure: no      Therapies Tried and outcome: Patient has been on regular follow-up for her cough with her primary care provider.  She recently completed azithromycin and Augmentin antibiotic.  She feels that her cough has improved significantly.      Patient Active Problem List   Diagnosis     Pain in limb     Other hammer toe (acquired)     Hallux valgus, acquired     Mild major depression (H)     Hypertension     HTN, goal below 140/90     GERD (gastroesophageal reflux disease)     Hyperlipidemia LDL goal <100     Vitamin D deficiency     Bloody diarrhea     Insomnia     Obesity     Diabetes mellitus without complication (H)     Obesity (BMI 35.0-39.9) with comorbidity (H)     Past Surgical History:   Procedure Laterality Date     C NONSPECIFIC PROCEDURE      Lt. leg vein stripping     C NONSPECIFIC PROCEDURE       x 1     C NONSPECIFIC PROCEDURE      vaginal delivery x 1     COLONOSCOPY       COLONOSCOPY  2014    Procedure: COLONOSCOPY;  COLONOSCOPY    ;  Surgeon: Stewart Torres MD;  Location:  GI     VASCULAR SURGERY         Social History     Tobacco Use     Smoking status: Former Smoker     Years: 20.00     Smokeless tobacco: Never Used     Tobacco comment: 1 pack every 2 weeks---would like to quit   Substance Use Topics     Alcohol use: Yes      Frequency: 2-3 times a week     Binge frequency: Never     Comment: rarely     Family History   Problem Relation Age of Onset     Eye Disorder Maternal Grandmother         glaucoma     Diabetes Maternal Grandmother      Breast Cancer Maternal Aunt              Reviewed and updated as needed this visit by Provider         Review of Systems   ROS COMP: Constitutional, HEENT, cardiovascular, pulmonary, gi and gu systems are negative, except as otherwise noted.      Objective    LMP 08/01/2007   There is no height or weight on file to calculate BMI.  Physical Exam   GENERAL: healthy, alert and no distress  HENT: normal cephalic/atraumatic, both ears: clear effusion, nose and mouth without ulcers or lesions, oropharynx clear, oral mucous membranes moist and sinuses: not tender  NECK: no adenopathy, no asymmetry, masses, or scars and thyroid normal to palpation  RESP: lungs clear to auscultation - no rales, rhonchi or wheezes  CV: regular rate and rhythm, normal S1 S2, no S3 or S4, no murmur, click or rub, no peripheral edema and peripheral pulses strong  MS: no gross musculoskeletal defects noted, no edema    Diagnostic Test Results:  none         Assessment & Plan     (H69.83) Dysfunction of both eustachian tubes  (primary encounter diagnosis)  Plan: pseudoePHEDrine (SUDAFED) 120 MG 12 hr tablet,         fluticasone (FLONASE) 50 MCG/ACT nasal spray.   Warm saline gargle  Steam inhalation  Using humidifier at home  Proper hydration         If symptoms are persisting despite taking the current medications, follow-up with PCP in 1 week            Follow-up with PCP in 1 week if symptoms are not improving    No follow-ups on file.    Winter Mehta PA-C  [facility]

## 2019-06-21 ENCOUNTER — ALLIED HEALTH/NURSE VISIT (OUTPATIENT)
Dept: NURSING | Facility: CLINIC | Age: 64
End: 2019-06-21
Payer: COMMERCIAL

## 2019-06-21 DIAGNOSIS — Z23 NEED FOR SHINGLES VACCINE: Primary | ICD-10-CM

## 2019-06-21 PROCEDURE — 90750 HZV VACC RECOMBINANT IM: CPT

## 2019-06-21 PROCEDURE — 99207 ZZC NO CHARGE NURSE ONLY: CPT

## 2019-06-21 PROCEDURE — 90471 IMMUNIZATION ADMIN: CPT

## 2019-06-21 NOTE — PROGRESS NOTES
Screening Questionnaire for Adult Immunization    Are you sick today?   No   Do you have allergies to medications, food, a vaccine component or latex?   No   Have you ever had a serious reaction after receiving a vaccination?   No   Do you have a long-term health problem with heart disease, lung disease, asthma, kidney disease, metabolic disease (e.g. diabetes), anemia, or other blood disorder?   No   Do you have cancer, leukemia, HIV/AIDS, or any other immune system problem?   No   In the past 3 months, have you taken medications that affect  your immune system, such as prednisone, other steroids, or anticancer drugs; drugs for the treatment of rheumatoid arthritis, Crohn s disease, or psoriasis; or have you had radiation treatments?   No   Have you had a seizure, or a brain or other nervous system problem?   No   During the past year, have you received a transfusion of blood or blood     products, or been given immune (gamma) globulin or antiviral drug?   No   For women: Are you pregnant or is there a chance you could become        pregnant during the next month?   No   Have you received any vaccinations in the past 4 weeks?   No     Immunization questionnaire answers were all negative.        Per orders of Stewart Jolly, injection of Shinrix given by Surekha Watts. Patient instructed to remain in clinic for 15 minutes afterwards, and to report any adverse reaction to me immediately.       Screening performed by Surekha Watts on 6/21/2019 at 10:30 AM.

## 2019-08-01 ENCOUNTER — TRANSFERRED RECORDS (OUTPATIENT)
Dept: MULTI SPECIALTY CLINIC | Facility: CLINIC | Age: 64
End: 2019-08-01

## 2019-09-29 ENCOUNTER — HEALTH MAINTENANCE LETTER (OUTPATIENT)
Age: 64
End: 2019-09-29

## 2019-10-02 DIAGNOSIS — I10 HTN, GOAL BELOW 140/90: ICD-10-CM

## 2019-10-02 RX ORDER — HYDROCHLOROTHIAZIDE 12.5 MG/1
12.5 TABLET ORAL DAILY
Qty: 90 TABLET | Refills: 1 | Status: SHIPPED | OUTPATIENT
Start: 2019-10-02 | End: 2019-11-08

## 2019-10-02 RX ORDER — LOSARTAN POTASSIUM 50 MG/1
50 TABLET ORAL DAILY
Qty: 90 TABLET | Refills: 1 | Status: SHIPPED | OUTPATIENT
Start: 2019-10-02 | End: 2019-11-08

## 2019-10-14 ENCOUNTER — ALLIED HEALTH/NURSE VISIT (OUTPATIENT)
Dept: NURSING | Facility: CLINIC | Age: 64
End: 2019-10-14
Payer: COMMERCIAL

## 2019-10-14 VITALS
BODY MASS INDEX: 37.76 KG/M2 | RESPIRATION RATE: 16 BRPM | WEIGHT: 205.2 LBS | HEIGHT: 62 IN | OXYGEN SATURATION: 94 % | SYSTOLIC BLOOD PRESSURE: 149 MMHG | HEART RATE: 78 BPM | DIASTOLIC BLOOD PRESSURE: 90 MMHG | TEMPERATURE: 98.1 F

## 2019-10-14 DIAGNOSIS — Z23 NEED FOR SHINGLES VACCINE: Primary | ICD-10-CM

## 2019-10-14 PROCEDURE — 99207 ZZC NO CHARGE NURSE ONLY: CPT

## 2019-10-14 PROCEDURE — 90750 HZV VACC RECOMBINANT IM: CPT

## 2019-10-14 PROCEDURE — 90471 IMMUNIZATION ADMIN: CPT

## 2019-10-14 ASSESSMENT — PAIN SCALES - GENERAL: PAINLEVEL: NO PAIN (0)

## 2019-10-14 ASSESSMENT — MIFFLIN-ST. JEOR: SCORE: 1454.78

## 2019-10-25 DIAGNOSIS — E78.5 HYPERLIPIDEMIA LDL GOAL <100: ICD-10-CM

## 2019-10-25 RX ORDER — ATORVASTATIN CALCIUM 40 MG/1
TABLET, FILM COATED ORAL
Qty: 90 TABLET | Refills: 0 | Status: SHIPPED | OUTPATIENT
Start: 2019-10-25 | End: 2019-11-08

## 2019-10-25 NOTE — TELEPHONE ENCOUNTER
Prescription approved per [facility] Refill Protocol.    Doris CONTEH RN, BSN, PHN

## 2019-10-25 NOTE — TELEPHONE ENCOUNTER
"Requested Prescriptions   Pending Prescriptions Disp Refills     atorvastatin (LIPITOR) 40 MG tablet [Pharmacy Med Name: ATORVASTATIN 40 MG TABLET] 90 tablet 3     Sig: TAKE 1 TABLET BY MOUTH EVERY DAY       Last Written Prescription Date:  10/31/18  Last Fill Quantity: 90 tablet,  # refills: 3   Last office visit: 6/11/2019 with prescribing provider:   Janine    Future Office Visit:   Next 5 appointments (look out 90 days)    Nov 08, 2019  9:50 AM CST  Adult Preventative Visit with Zuri William PA-C, CR EXAM ROOM 06  [facility] [address] [place] [id number] [phone]             Statins Protocol Passed - 10/25/2019  2:09 AM        Passed - LDL on file in past 12 months     Recent Labs   Lab Test 10/31/18  0822   LDL 78             Passed - No abnormal creatine kinase in past 12 months     No lab results found.             Passed - Recent (12 mo) or future (30 days) visit within the authorizing provider's specialty     Patient has had an office visit with the authorizing provider or a provider within the authorizing providers department within the previous 12 mos or has a future within next 30 days. See \"Patient Info\" tab in inbasket, or \"Choose Columns\" in Meds & Orders section of the refill encounter.              Passed - Medication is active on med list        Passed - Patient is age 18 or older        Passed - No active pregnancy on record        Passed - No positive pregnancy test in past 12 months          "

## 2019-11-03 DIAGNOSIS — E11.9 DIABETES MELLITUS WITHOUT COMPLICATION (H): ICD-10-CM

## 2019-11-03 DIAGNOSIS — F33.0 MAJOR DEPRESSIVE DISORDER, RECURRENT EPISODE, MILD (H): ICD-10-CM

## 2019-11-04 NOTE — TELEPHONE ENCOUNTER
Routing refill request to provider for review/approval because:  Labs out of range:  Celexa: PHQ9 >4   Labs due: LDL and AlC    Patient has upcoming appointment on 11/8/19    Lisa Reddy RN Flex

## 2019-11-04 NOTE — TELEPHONE ENCOUNTER
"Requested Prescriptions   Pending Prescriptions Disp Refills     citalopram (CELEXA) 40 MG tablet [Pharmacy Med Name: CITALOPRAM HBR 40 MG TABLET]  Last Written Prescription Date:  3/15/2019  Last Fill Quantity: 90 tablet,  # refills: 1   Last office visit: 6/11/2019 with prescribing provider:  Janine   Future Office Visit:   Next 5 appointments (look out 90 days)    Nov 08, 2019  9:50 AM CST  Adult Preventative Visit with Zuri William PA-C, CR EXAM ROOM 06  [facility] ([facility]) [address] [place] [id number] [phone]          90 tablet 1     Sig: TAKE 1 TABLET BY MOUTH EVERY DAY       SSRIs Protocol Failed - 11/3/2019 12:52 AM  PHQ-9 SCORE 10/10/2017 4/17/2018 3/15/2019   PHQ-9 Total Score - - -   PHQ-9 Total Score 21 3 5     PILLO-7 SCORE 10/10/2017 4/17/2018 3/15/2019   Total Score - - -   Total Score 20 1 1               Failed - PHQ-9 score less than 5 in past 6 months     PHQ-9 SCORE 10/10/2017 4/17/2018 3/15/2019   PHQ-9 Total Score - - -   PHQ-9 Total Score 21 3 5               Passed - Medication is active on med list        Passed - Patient is age 18 or older        Passed - No active pregnancy on record        Passed - No positive pregnancy test in last 12 months        Passed - Recent (6 mo) or future (30 days) visit within the authorizing provider's specialty     Patient had office visit in the last 6 months or has a visit in the next 30 days with authorizing provider or within the authorizing provider's specialty.  See \"Patient Info\" tab in inbasket, or \"Choose Columns\" in Meds & Orders section of the refill encounter.            metFORMIN (GLUCOPHAGE-XR) 500 MG 24 hr tablet [Pharmacy Med Name: METFORMIN HCL  MG TABLET]  Last Written Prescription Date:  3/15/2019  Last Fill Quantity: 90 tablet,  # refills: 1   Last office visit: 6/11/2019 with prescribing provider:  Janine   Future Office Visit:   Next 5 appointments (look out 90 " "days)    Nov 08, 2019  9:50 AM CST  Adult Preventative Visit with Zuri William PA-C, CR EXAM ROOM 06  [facility] [id number] [place] [id number] [phone]          90 tablet 1     Sig: TAKE 1 TABLET (500 MG) BY MOUTH DAILY (WITH DINNER)       Biguanide Agents Failed - 11/3/2019 12:52 AM        Failed - Blood pressure less than 140/90 in past 6 months     BP Readings from Last 3 Encounters:   10/14/19 (!) 149/90   06/11/19 136/82   05/22/19 138/86                 Failed - Patient has documented LDL within the past 12 mos.     Recent Labs   Lab Test 10/31/18  0822   LDL 78             Passed - Patient has had a Microalbumin in the past 15 mos.     Recent Labs   Lab Test 10/31/18  0823   MICROL 41   UMALCR 10.54             Passed - Patient is age 10 or older        Passed - Patient has documented A1c within the specified period of time.     If HgbA1C is 8 or greater, it needs to be on file within the past 3 months.  If less than 8, must be on file within the past 6 months.     Recent Labs   Lab Test 05/22/19  0938   A1C 6.2*             Passed - Patient's CR is NOT>1.4 OR Patient's EGFR is NOT<45 within past 12 mos.     Recent Labs   Lab Test 05/22/19  0938   GFRESTIMATED 86   GFRESTBLACK >90       Recent Labs   Lab Test 05/22/19  0938   CR 0.75             Passed - Patient does NOT have a diagnosis of CHF.        Passed - Medication is active on med list        Passed - Patient is not pregnant        Passed - Patient has not had a positive pregnancy test within the past 12 mos.         Passed - Recent (6 mo) or future (30 days) visit within the authorizing provider's specialty     Patient had office visit in the last 6 months or has a visit in the next 30 days with authorizing provider or within the authorizing provider's specialty.  See \"Patient Info\" tab in inbasket, or \"Choose Columns\" in Meds & Orders section of the refill encounter. "

## 2019-11-05 RX ORDER — METFORMIN HCL 500 MG
TABLET, EXTENDED RELEASE 24 HR ORAL
Qty: 90 TABLET | Refills: 1 | Status: SHIPPED | OUTPATIENT
Start: 2019-11-05 | End: 2019-11-08

## 2019-11-05 RX ORDER — CITALOPRAM HYDROBROMIDE 40 MG/1
TABLET ORAL
Qty: 90 TABLET | Refills: 1 | Status: SHIPPED | OUTPATIENT
Start: 2019-11-05 | End: 2020-02-13

## 2019-11-08 ENCOUNTER — RESULT FOLLOW UP (OUTPATIENT)
Dept: FAMILY MEDICINE | Facility: CLINIC | Age: 64
End: 2019-11-08

## 2019-11-08 ENCOUNTER — OFFICE VISIT (OUTPATIENT)
Dept: FAMILY MEDICINE | Facility: CLINIC | Age: 64
End: 2019-11-08
Payer: COMMERCIAL

## 2019-11-08 VITALS
BODY MASS INDEX: 37.42 KG/M2 | SYSTOLIC BLOOD PRESSURE: 139 MMHG | DIASTOLIC BLOOD PRESSURE: 76 MMHG | WEIGHT: 207 LBS | TEMPERATURE: 98.5 F | OXYGEN SATURATION: 95 % | HEART RATE: 78 BPM

## 2019-11-08 DIAGNOSIS — R87.610 ASCUS OF CERVIX WITH NEGATIVE HIGH RISK HPV: ICD-10-CM

## 2019-11-08 DIAGNOSIS — E78.5 HYPERLIPIDEMIA LDL GOAL <100: ICD-10-CM

## 2019-11-08 DIAGNOSIS — Z12.4 SCREENING FOR MALIGNANT NEOPLASM OF CERVIX: ICD-10-CM

## 2019-11-08 DIAGNOSIS — G47.00 INSOMNIA, UNSPECIFIED TYPE: ICD-10-CM

## 2019-11-08 DIAGNOSIS — Z00.00 ROUTINE GENERAL MEDICAL EXAMINATION AT A HEALTH CARE FACILITY: Primary | ICD-10-CM

## 2019-11-08 DIAGNOSIS — E53.8 VITAMIN B12 DEFICIENCY (NON ANEMIC): ICD-10-CM

## 2019-11-08 DIAGNOSIS — E11.9 DIABETES MELLITUS WITHOUT COMPLICATION (H): ICD-10-CM

## 2019-11-08 DIAGNOSIS — K21.9 GASTROESOPHAGEAL REFLUX DISEASE WITHOUT ESOPHAGITIS: ICD-10-CM

## 2019-11-08 DIAGNOSIS — I10 HTN, GOAL BELOW 140/90: ICD-10-CM

## 2019-11-08 LAB
ALBUMIN SERPL-MCNC: 3.8 G/DL (ref 3.4–5)
ALP SERPL-CCNC: 96 U/L (ref 40–150)
ALT SERPL W P-5'-P-CCNC: 61 U/L (ref 0–50)
ANION GAP SERPL CALCULATED.3IONS-SCNC: 6 MMOL/L (ref 3–14)
AST SERPL W P-5'-P-CCNC: 105 U/L (ref 0–45)
BILIRUB SERPL-MCNC: 0.4 MG/DL (ref 0.2–1.3)
BUN SERPL-MCNC: 15 MG/DL (ref 7–30)
CALCIUM SERPL-MCNC: 9.5 MG/DL (ref 8.5–10.1)
CHLORIDE SERPL-SCNC: 104 MMOL/L (ref 94–109)
CHOLEST SERPL-MCNC: 241 MG/DL
CO2 SERPL-SCNC: 26 MMOL/L (ref 20–32)
CREAT SERPL-MCNC: 0.6 MG/DL (ref 0.52–1.04)
CREAT UR-MCNC: 160 MG/DL
ERYTHROCYTE [DISTWIDTH] IN BLOOD BY AUTOMATED COUNT: 13.3 % (ref 10–15)
GFR SERPL CREATININE-BSD FRML MDRD: >90 ML/MIN/{1.73_M2}
GLUCOSE SERPL-MCNC: 120 MG/DL (ref 70–99)
HBA1C MFR BLD: 5.7 % (ref 0–5.6)
HCT VFR BLD AUTO: 37.2 % (ref 35–47)
HDLC SERPL-MCNC: 111 MG/DL
HGB BLD-MCNC: 12.6 G/DL (ref 11.7–15.7)
LDLC SERPL CALC-MCNC: 100 MG/DL
MCH RBC QN AUTO: 36 PG (ref 26.5–33)
MCHC RBC AUTO-ENTMCNC: 33.9 G/DL (ref 31.5–36.5)
MCV RBC AUTO: 106 FL (ref 78–100)
MICROALBUMIN UR-MCNC: 17 MG/L
MICROALBUMIN/CREAT UR: 10.5 MG/G CR (ref 0–25)
NONHDLC SERPL-MCNC: 130 MG/DL
PLATELET # BLD AUTO: 221 10E9/L (ref 150–450)
POTASSIUM SERPL-SCNC: 4 MMOL/L (ref 3.4–5.3)
PROT SERPL-MCNC: 8.1 G/DL (ref 6.8–8.8)
RBC # BLD AUTO: 3.5 10E12/L (ref 3.8–5.2)
SODIUM SERPL-SCNC: 136 MMOL/L (ref 133–144)
TRIGL SERPL-MCNC: 151 MG/DL
VIT B12 SERPL-MCNC: 1966 PG/ML (ref 193–986)
WBC # BLD AUTO: 8.2 10E9/L (ref 4–11)

## 2019-11-08 PROCEDURE — 87389 HIV-1 AG W/HIV-1&-2 AB AG IA: CPT | Performed by: PHYSICIAN ASSISTANT

## 2019-11-08 PROCEDURE — 87624 HPV HI-RISK TYP POOLED RSLT: CPT | Performed by: PHYSICIAN ASSISTANT

## 2019-11-08 PROCEDURE — 85027 COMPLETE CBC AUTOMATED: CPT | Performed by: PHYSICIAN ASSISTANT

## 2019-11-08 PROCEDURE — 80061 LIPID PANEL: CPT | Performed by: PHYSICIAN ASSISTANT

## 2019-11-08 PROCEDURE — 99207 C FOOT EXAM  NO CHARGE: CPT | Mod: 25 | Performed by: PHYSICIAN ASSISTANT

## 2019-11-08 PROCEDURE — 82043 UR ALBUMIN QUANTITATIVE: CPT | Performed by: PHYSICIAN ASSISTANT

## 2019-11-08 PROCEDURE — 82607 VITAMIN B-12: CPT | Performed by: PHYSICIAN ASSISTANT

## 2019-11-08 PROCEDURE — 36415 COLL VENOUS BLD VENIPUNCTURE: CPT | Performed by: PHYSICIAN ASSISTANT

## 2019-11-08 PROCEDURE — 80053 COMPREHEN METABOLIC PANEL: CPT | Performed by: PHYSICIAN ASSISTANT

## 2019-11-08 PROCEDURE — G0124 SCREEN C/V THIN LAYER BY MD: HCPCS | Performed by: PHYSICIAN ASSISTANT

## 2019-11-08 PROCEDURE — 99396 PREV VISIT EST AGE 40-64: CPT | Performed by: PHYSICIAN ASSISTANT

## 2019-11-08 PROCEDURE — G0145 SCR C/V CYTO,THINLAYER,RESCR: HCPCS | Performed by: PHYSICIAN ASSISTANT

## 2019-11-08 PROCEDURE — 83036 HEMOGLOBIN GLYCOSYLATED A1C: CPT | Performed by: PHYSICIAN ASSISTANT

## 2019-11-08 RX ORDER — HYDROCHLOROTHIAZIDE 12.5 MG/1
12.5 TABLET ORAL DAILY
Qty: 90 TABLET | Refills: 1 | Status: SHIPPED | OUTPATIENT
Start: 2019-11-08 | End: 2020-01-03

## 2019-11-08 RX ORDER — METFORMIN HCL 500 MG
TABLET, EXTENDED RELEASE 24 HR ORAL
Qty: 90 TABLET | Refills: 1 | Status: SHIPPED | OUTPATIENT
Start: 2019-11-08 | End: 2020-02-13

## 2019-11-08 RX ORDER — LOSARTAN POTASSIUM 50 MG/1
50 TABLET ORAL DAILY
Qty: 90 TABLET | Refills: 1 | Status: SHIPPED | OUTPATIENT
Start: 2019-11-08 | End: 2020-01-03

## 2019-11-08 RX ORDER — ATORVASTATIN CALCIUM 40 MG/1
TABLET, FILM COATED ORAL
Qty: 90 TABLET | Refills: 0 | Status: SHIPPED | OUTPATIENT
Start: 2019-11-08 | End: 2020-02-10

## 2019-11-08 RX ORDER — TRAZODONE HYDROCHLORIDE 50 MG/1
50-100 TABLET, FILM COATED ORAL
Qty: 180 TABLET | Refills: 3 | Status: SHIPPED | OUTPATIENT
Start: 2019-11-08 | End: 2020-07-13

## 2019-11-08 SDOH — HEALTH STABILITY: MENTAL HEALTH: HOW MANY STANDARD DRINKS CONTAINING ALCOHOL DO YOU HAVE ON A TYPICAL DAY?: 1 OR 2

## 2019-11-08 SDOH — HEALTH STABILITY: MENTAL HEALTH
STRESS IS WHEN SOMEONE FEELS TENSE, NERVOUS, ANXIOUS, OR CAN'T SLEEP AT NIGHT BECAUSE THEIR MIND IS TROUBLED. HOW STRESSED ARE YOU?: NOT AT ALL

## 2019-11-08 SDOH — SOCIAL STABILITY: SOCIAL NETWORK: HOW OFTEN DO YOU ATTENT MEETINGS OF THE CLUB OR ORGANIZATION YOU BELONG TO?: MORE THAN 4 TIMES PER YEAR

## 2019-11-08 SDOH — SOCIAL STABILITY: SOCIAL NETWORK
DO YOU BELONG TO ANY CLUBS OR ORGANIZATIONS SUCH AS CHURCH GROUPS UNIONS, FRATERNAL OR ATHLETIC GROUPS, OR SCHOOL GROUPS?: YES

## 2019-11-08 SDOH — SOCIAL STABILITY: SOCIAL NETWORK: HOW OFTEN DO YOU GET TOGETHER WITH FRIENDS OR RELATIVES?: TWICE A WEEK

## 2019-11-08 SDOH — HEALTH STABILITY: PHYSICAL HEALTH: ON AVERAGE, HOW MANY DAYS PER WEEK DO YOU ENGAGE IN MODERATE TO STRENUOUS EXERCISE (LIKE A BRISK WALK)?: 2 DAYS

## 2019-11-08 ASSESSMENT — ENCOUNTER SYMPTOMS
CONSTIPATION: 0
FEVER: 0
ABDOMINAL PAIN: 0
DIZZINESS: 0
NERVOUS/ANXIOUS: 0
DIARRHEA: 0
HEMATOCHEZIA: 0
EYE PAIN: 0
CHILLS: 0
COUGH: 0
HEMATURIA: 0

## 2019-11-08 ASSESSMENT — ANXIETY QUESTIONNAIRES
3. WORRYING TOO MUCH ABOUT DIFFERENT THINGS: NOT AT ALL
4. TROUBLE RELAXING: NOT AT ALL
GAD7 TOTAL SCORE: 0
7. FEELING AFRAID AS IF SOMETHING AWFUL MIGHT HAPPEN: NOT AT ALL
1. FEELING NERVOUS, ANXIOUS, OR ON EDGE: NOT AT ALL
7. FEELING AFRAID AS IF SOMETHING AWFUL MIGHT HAPPEN: NOT AT ALL
5. BEING SO RESTLESS THAT IT IS HARD TO SIT STILL: NOT AT ALL
2. NOT BEING ABLE TO STOP OR CONTROL WORRYING: NOT AT ALL
GAD7 TOTAL SCORE: 0
GAD7 TOTAL SCORE: 0
6. BECOMING EASILY ANNOYED OR IRRITABLE: NOT AT ALL

## 2019-11-08 ASSESSMENT — PATIENT HEALTH QUESTIONNAIRE - PHQ9
SUM OF ALL RESPONSES TO PHQ QUESTIONS 1-9: 0
SUM OF ALL RESPONSES TO PHQ QUESTIONS 1-9: 0
10. IF YOU CHECKED OFF ANY PROBLEMS, HOW DIFFICULT HAVE THESE PROBLEMS MADE IT FOR YOU TO DO YOUR WORK, TAKE CARE OF THINGS AT HOME, OR GET ALONG WITH OTHER PEOPLE: NOT DIFFICULT AT ALL

## 2019-11-08 NOTE — PROGRESS NOTES
SUBJECTIVE:   CC: Angelique Sharp is an 64 year old woman who presents for preventive health visit.     Healthy Habits:     Getting at least 3 servings of Calcium per day:  Yes    Bi-annual eye exam:  Yes    Dental care twice a year:  Yes    Sleep apnea or symptoms of sleep apnea:  None    Diet:  Regular (no restrictions)    Frequency of exercise:  2-3 days/week    Duration of exercise:  15-30 minutes    Taking medications regularly:  Yes    Medication side effects:  None    PHQ-2 Total Score: 0    Additional concerns today:  No        NHUNG signed for [facility] - Diabetic Eye Exam 2019    Today's PHQ-2 Score:   PHQ-2 ( 1999 [employer]) 11/8/2019   Q1: Little interest or pleasure in doing things 0   Q2: Feeling down, depressed or hopeless 0   PHQ-2 Score 0   Q1: Little interest or pleasure in doing things Not at all   Q2: Feeling down, depressed or hopeless Not at all   PHQ-2 Score 0       Abuse: Current or Past(Physical, Sexual or Emotional)- No  Do you feel safe in your environment? Yes    Have you ever done Advance Care Planning? : Yes, patient states has an Advance Care Planning document and will bring a copy to the clinic.    Social History     Tobacco Use     Smoking status: Former Smoker     Years: 20.00     Smokeless tobacco: Never Used     Tobacco comment: 1 pack every 2 weeks---would like to quit   Substance Use Topics     Alcohol use: Yes     Frequency: 2-3 times a week     Drinks per session: 1 or 2     Binge frequency: Never     Comment: rarely         Alcohol Use 11/8/2019   Prescreen: >3 drinks/day or >7 drinks/week? No   Prescreen: >3 drinks/day or >7 drinks/week? -       Reviewed orders with patient.  Reviewed health maintenance and updated orders accordingly - Yes  Lab work is in process    Mammogram Screening: Patient over age 50, mutual decision to screen reflected in health maintenance.    Pertinent mammograms are reviewed under the imaging tab.  History of abnormal Pap smear: NO - age 30-65  PAP every 5 years with negative HPV co-testing recommended  PAP / HPV 9/10/2015 3/19/2012 3/2/2011   PAP NIL NIL NIL     Reviewed and updated as needed this visit by clinical staff  Tobacco  Allergies  Meds  Med Hx  Surg Hx  Fam Hx  Soc Hx        Reviewed and updated as needed this visit by Provider        Past Medical History:   Diagnosis Date     Depression      GERD (gastroesophageal reflux disease)      High cholesterol         Review of Systems   Constitutional: Negative for chills and fever.   HENT: Negative for congestion and ear pain.    Eyes: Negative for pain.   Respiratory: Negative for cough.    Cardiovascular: Negative for chest pain.   Gastrointestinal: Negative for abdominal pain, constipation, diarrhea and hematochezia.   Genitourinary: Negative for hematuria.   Neurological: Negative for dizziness.   Psychiatric/Behavioral: The patient is not nervous/anxious.           OBJECTIVE:   BP (!) 142/78 (BP Location: Right arm, Patient Position: Sitting, Cuff Size: Adult Regular)   Pulse 78   Temp 98.5  F (36.9  C) (Oral)   Wt 93.9 kg (207 lb)   LMP 08/01/2007   SpO2 95%   BMI 37.42 kg/m    Physical Exam  GENERAL APPEARANCE: healthy, alert and no distress  EYES: Eyes grossly normal to inspection, PERRL and conjunctivae and sclerae normal  HENT: ear canals and TM's normal, nose and mouth without ulcers or lesions, oropharynx clear and oral mucous membranes moist  NECK: no adenopathy, no asymmetry, masses, or scars and thyroid normal to palpation  RESP: lungs clear to auscultation - no rales, rhonchi or wheezes  BREAST: normal without masses, tenderness or nipple discharge and no palpable axillary masses or adenopathy  CV: regular rate and rhythm, normal S1 S2, no S3 or S4, no murmur, click or rub, no peripheral edema and peripheral pulses strong  ABDOMEN: soft, nontender, no hepatosplenomegaly, no masses and bowel sounds normal   (female): normal female external genitalia, normal urethral  meatus, vaginal mucosal atrophy noted, normal cervix, adnexae, and uterus without masses or abnormal discharge  MS: no musculoskeletal defects are noted and gait is age appropriate without ataxia  SKIN: no suspicious lesions or rashes  NEURO: Normal strength and tone, sensory exam grossly normal, mentation intact and speech normal  DIABETIC FOOT EXAM: normal DP and PT pulses, no trophic changes or ulcerative lesions and normal sensory exam  PSYCH: mentation appears normal and affect normal/bright        ASSESSMENT/PLAN:   1. Routine general medical examination at a health care facility    - HIV Screening  - Lipid panel reflex to direct LDL Non-fasting  - Albumin Random Urine Quantitative with Creat Ratio  - REVIEW OF HEALTH MAINTENANCE PROTOCOL ORDERS  - CBC with platelets  - Comprehensive metabolic panel  - Hemoglobin A1c    2. Diabetes mellitus without complication (H)  Await labs.   - FOOT EXAM  - Hemoglobin A1c  - metFORMIN (GLUCOPHAGE-XR) 500 MG 24 hr tablet; TAKE 1 TABLET (500 MG) BY MOUTH DAILY (WITH DINNER)  Dispense: 90 tablet; Refill: 1    3. HTN, goal below 140/90  Stable.   - hydrochlorothiazide (HYDRODIURIL) 12.5 MG tablet; Take 1 tablet (12.5 mg) by mouth daily  Dispense: 90 tablet; Refill: 1  - losartan (COZAAR) 50 MG tablet; Take 1 tablet (50 mg) by mouth daily  Dispense: 90 tablet; Refill: 1    4. Hyperlipidemia LDL goal <100    - atorvastatin (LIPITOR) 40 MG tablet; TAKE 1 TABLET BY MOUTH EVERY DAY  Dispense: 90 tablet; Refill: 0    5. Gastroesophageal reflux disease without esophagitis    - omeprazole (PRILOSEC) 20 MG DR capsule; TAKE 1 CAPSULE (20 MG) BY MOUTH DAILY  Dispense: 90 capsule; Refill: 3    6. Insomnia, unspecified type    - traZODone (DESYREL) 50 MG tablet; Take 1-2 tablets ( mg) by mouth nightly as needed for sleep  Dispense: 180 tablet; Refill: 3    7. Vitamin B12 deficiency (non anemic)  Taking daily  - Vitamin B12    8. Screening for malignant neoplasm of cervix    - HPV  "High Risk Types DNA Cervical  - Pap imaged thin layer screen with HPV - recommended age 30 - 65 years (select HPV order below)    COUNSELING:  Reviewed preventive health counseling, as reflected in patient instructions       Regular exercise       Healthy diet/nutrition       Vision screening       Hearing screening       Consider Hep C screening for patients born between 1945 and 1965       HIV screeninx in teen years, 1x in adult years, and at intervals if high risk    Estimated body mass index is 37.42 kg/m  as calculated from the following:    Height as of 10/14/19: 1.584 m (5' 2.36\").    Weight as of this encounter: 93.9 kg (207 lb).    Weight management plan: Discussed healthy diet and exercise guidelines     reports that she has quit smoking. She quit after 20.00 years of use. She has never used smokeless tobacco.      Counseling Resources:  ATP IV Guidelines  Pooled Cohorts Equation Calculator  Breast Cancer Risk Calculator  FRAX Risk Assessment  ICSI Preventive Guidelines  Dietary Guidelines for Americans,   [employer]'s MyPlate  ASA Prophylaxis  Lung CA Screening    Zuri William PA-C  [facility]  Answers for HPI/ROS submitted by the patient on 2019   Annual Exam:  If you checked off any problems, how difficult have these problems made it for you to do your work, take care of things at home, or get along with other people?: Not difficult at all  PHQ9 TOTAL SCORE: 0  PILLO 7 TOTAL SCORE: 0    "

## 2019-11-09 ASSESSMENT — ANXIETY QUESTIONNAIRES: GAD7 TOTAL SCORE: 0

## 2019-11-09 ASSESSMENT — PATIENT HEALTH QUESTIONNAIRE - PHQ9: SUM OF ALL RESPONSES TO PHQ QUESTIONS 1-9: 0

## 2019-11-11 LAB — HIV 1+2 AB+HIV1 P24 AG SERPL QL IA: NONREACTIVE

## 2019-11-12 DIAGNOSIS — R74.8 ELEVATED LIVER ENZYMES: Primary | ICD-10-CM

## 2019-11-13 LAB
COPATH REPORT: ABNORMAL
PAP: ABNORMAL

## 2019-11-14 LAB
FINAL DIAGNOSIS: NORMAL
HPV HR 12 DNA CVX QL NAA+PROBE: NEGATIVE
HPV16 DNA SPEC QL NAA+PROBE: NEGATIVE
HPV18 DNA SPEC QL NAA+PROBE: NEGATIVE
SPECIMEN DESCRIPTION: NORMAL
SPECIMEN SOURCE CVX/VAG CYTO: NORMAL

## 2019-11-15 NOTE — PROGRESS NOTES
11/8/19 ASCUS pap with Neg HPV. Plan cotest in 1 year per Ob/Gyn.    11/19/19 Patient notified of results/ recommendations. (LN)

## 2019-11-19 ENCOUNTER — TELEPHONE (OUTPATIENT)
Dept: FAMILY MEDICINE | Facility: CLINIC | Age: 64
End: 2019-11-19

## 2019-11-19 DIAGNOSIS — N95.2 ATROPHIC VAGINITIS: Primary | ICD-10-CM

## 2019-11-19 RX ORDER — ESTRADIOL 0.1 MG/G
2 CREAM VAGINAL
Qty: 42.5 G | Refills: 1 | Status: SHIPPED | OUTPATIENT
Start: 2019-11-21 | End: 2020-07-13

## 2019-11-19 NOTE — TELEPHONE ENCOUNTER
Notes recorded by Nissen, Lynette G, RN on 11/19/2019 at 10:29 AM JOSE Kraft I spoke with the patient and she is in agreement to use the vaginal estrogen 3 months prior to her repeat co-test next year.  Could you please send this over to the [facility] [address] and she will pick this up to use 3 months prior.  She will let us know if she has any questions before then.  Lynette Nissen RN

## 2019-11-19 NOTE — TELEPHONE ENCOUNTER
Notes recorded by Lucia Esteves MD on 11/15/2019 at 10:44 AM CST  I would just cotest in 1 year. If she has significant atrophic vaginitis, could treat with vaginal estrogen for 3 months prior to retest in 1 year. Often the ASCUS with negative HPV as a new finding in a postmenopausal patient is due to this.    Lucia Esteves MD

## 2019-12-13 ENCOUNTER — TELEPHONE (OUTPATIENT)
Dept: FAMILY MEDICINE | Facility: CLINIC | Age: 64
End: 2019-12-13

## 2020-01-03 DIAGNOSIS — I10 HTN, GOAL BELOW 140/90: ICD-10-CM

## 2020-01-03 RX ORDER — HYDROCHLOROTHIAZIDE 12.5 MG/1
12.5 TABLET ORAL DAILY
Qty: 90 TABLET | Refills: 1 | Status: SHIPPED | OUTPATIENT
Start: 2020-01-03 | End: 2020-06-18

## 2020-01-03 RX ORDER — LOSARTAN POTASSIUM 50 MG/1
50 TABLET ORAL DAILY
Qty: 90 TABLET | Refills: 1 | Status: SHIPPED | OUTPATIENT
Start: 2020-01-03 | End: 2020-06-18

## 2020-01-03 NOTE — TELEPHONE ENCOUNTER
"Last Written Prescription Date:  11.18.19  Last Fill Quantity: 90,  # refills: 1   Last office visit: 11/8/2019 with prescribing provider:  Stewart   Future Office Visit:      Requested Prescriptions   Pending Prescriptions Disp Refills     hydrochlorothiazide (HYDRODIURIL) 12.5 MG tablet 90 tablet 1     Sig: Take 1 tablet (12.5 mg) by mouth daily       Diuretics (Including Combos) Protocol Passed - 1/3/2020  9:46 AM        Passed - Blood pressure under 140/90 in past 12 months     BP Readings from Last 3 Encounters:   11/08/19 139/76   10/14/19 (!) 149/90   06/11/19 136/82                 Passed - Recent (12 mo) or future (30 days) visit within the authorizing provider's specialty     Patient has had an office visit with the authorizing provider or a provider within the authorizing providers department within the previous 12 mos or has a future within next 30 days. See \"Patient Info\" tab in inbasket, or \"Choose Columns\" in Meds & Orders section of the refill encounter.              Passed - Medication is active on med list        Passed - Patient is age 18 or older        Passed - No active pregancy on record        Passed - Normal serum creatinine on file in past 12 months     Recent Labs   Lab Test 11/08/19  1028   CR 0.60              Passed - Normal serum potassium on file in past 12 months     Recent Labs   Lab Test 11/08/19  1028   POTASSIUM 4.0                    Passed - Normal serum sodium on file in past 12 months     Recent Labs   Lab Test 11/08/19  1028                 Passed - No positive pregnancy test in past 12 months        losartan (COZAAR) 50 MG tablet 90 tablet 1     Sig: Take 1 tablet (50 mg) by mouth daily       Angiotensin-II Receptors Passed - 1/3/2020  9:46 AM        Passed - Last blood pressure under 140/90 in past 12 months     BP Readings from Last 3 Encounters:   11/08/19 139/76   10/14/19 (!) 149/90   06/11/19 136/82                 Passed - Recent (12 mo) or future (30 days) visit " "within the authorizing provider's specialty     Patient has had an office visit with the authorizing provider or a provider within the authorizing providers department within the previous 12 mos or has a future within next 30 days. See \"Patient Info\" tab in inbasket, or \"Choose Columns\" in Meds & Orders section of the refill encounter.              Passed - Medication is active on med list        Passed - Patient is age 18 or older        Passed - No active pregnancy on record        Passed - Normal serum creatinine on file in past 12 months     Recent Labs   Lab Test 11/08/19  1028  09/22/18  0901   CR 0.60   < >  --    CREAT  --   --  0.8    < > = values in this interval not displayed.             Passed - Normal serum potassium on file in past 12 months     Recent Labs   Lab Test 11/08/19  1028   POTASSIUM 4.0                    Passed - No positive pregnancy test in past 12 months        Prescription approved per [facility] Refill Protocol  Flor Paz RN BS      "

## 2020-02-10 ENCOUNTER — TELEPHONE (OUTPATIENT)
Dept: FAMILY MEDICINE | Facility: CLINIC | Age: 65
End: 2020-02-10

## 2020-02-10 DIAGNOSIS — E78.5 HYPERLIPIDEMIA LDL GOAL <100: ICD-10-CM

## 2020-02-10 RX ORDER — ATORVASTATIN CALCIUM 40 MG/1
TABLET, FILM COATED ORAL
Qty: 90 TABLET | Refills: 2 | Status: SHIPPED | OUTPATIENT
Start: 2020-02-10 | End: 2020-07-24

## 2020-02-10 NOTE — TELEPHONE ENCOUNTER
"Requested Prescriptions   Pending Prescriptions Disp Refills     atorvastatin (LIPITOR) 40 MG tablet 90 tablet 0     Sig: TAKE 1 TABLET BY MOUTH EVERY DAY   Last Written Prescription Date:  11/8/2019  Last Fill Quantity: 90,  # refills: 0   Last office visit: 11/8/2019 with prescribing provider   Future Office Visit:        Statins Protocol Passed - 2/10/2020 10:57 AM        Passed - LDL on file in past 12 months     Recent Labs   Lab Test 11/08/19  1028   *             Passed - No abnormal creatine kinase in past 12 months     No lab results found.             Passed - Recent (12 mo) or future (30 days) visit within the authorizing provider's specialty     Patient has had an office visit with the authorizing provider or a provider within the authorizing providers department within the previous 12 mos or has a future within next 30 days. See \"Patient Info\" tab in inbasket, or \"Choose Columns\" in Meds & Orders section of the refill encounter.              Passed - Medication is active on med list        Passed - Patient is age 18 or older        Passed - No active pregnancy on record        Passed - No positive pregnancy test in past 12 months        Prescription approved per [facility] Refill Protocol.  Haylee Mabry RN on 2/10/2020 at 12:44 PM    "

## 2020-02-10 NOTE — TELEPHONE ENCOUNTER
Patient is switching pharmacies from [facility] to [facility], something should be coming from fax for this on refills    Do not send anymore to [facility]     Lisha Chow/

## 2020-02-12 DIAGNOSIS — F33.0 MAJOR DEPRESSIVE DISORDER, RECURRENT EPISODE, MILD (H): ICD-10-CM

## 2020-02-12 DIAGNOSIS — K21.9 GASTROESOPHAGEAL REFLUX DISEASE WITHOUT ESOPHAGITIS: ICD-10-CM

## 2020-02-12 DIAGNOSIS — E11.9 DIABETES MELLITUS WITHOUT COMPLICATION (H): ICD-10-CM

## 2020-02-13 RX ORDER — CITALOPRAM HYDROBROMIDE 40 MG/1
40 TABLET ORAL DAILY
Qty: 90 TABLET | Refills: 1 | Status: SHIPPED | OUTPATIENT
Start: 2020-02-13 | End: 2020-05-04

## 2020-02-13 RX ORDER — METFORMIN HCL 500 MG
TABLET, EXTENDED RELEASE 24 HR ORAL
Qty: 90 TABLET | Refills: 0 | Status: SHIPPED | OUTPATIENT
Start: 2020-02-13 | End: 2020-06-01

## 2020-02-13 NOTE — TELEPHONE ENCOUNTER
"Remaining refills sent to patient's new, requesting pharmacy.    Esther Hall RN  [facility]        Requested Prescriptions   Pending Prescriptions Disp Refills     metFORMIN (GLUCOPHAGE-XR) 500 MG 24 hr tablet 90 tablet 1     Sig: TAKE 1 TABLET (500 MG) BY MOUTH DAILY (WITH DINNER)       Biguanide Agents Passed - 2/12/2020  3:05 PM        Passed - Blood pressure less than 140/90 in past 6 months     BP Readings from Last 3 Encounters:   11/08/19 139/76   10/14/19 (!) 149/90   06/11/19 136/82                 Passed - Patient has documented LDL within the past 12 mos.     Recent Labs   Lab Test 11/08/19  1028   *             Passed - Patient has had a Microalbumin in the past 15 mos.     Recent Labs   Lab Test 11/08/19  1028   MICROL 17   UMALCR 10.50             Passed - Patient is age 10 or older        Passed - Patient has documented A1c within the specified period of time.     If HgbA1C is 8 or greater, it needs to be on file within the past 3 months.  If less than 8, must be on file within the past 6 months.     Recent Labs   Lab Test 11/08/19  1028   A1C 5.7*             Passed - Patient's CR is NOT>1.4 OR Patient's EGFR is NOT<45 within past 12 mos.     Recent Labs   Lab Test 11/08/19  1028   GFRESTIMATED >90   GFRESTBLACK >90       Recent Labs   Lab Test 11/08/19  1028   CR 0.60             Passed - Patient does NOT have a diagnosis of CHF.        Passed - Medication is active on med list        Passed - Patient is not pregnant        Passed - Patient has not had a positive pregnancy test within the past 12 mos.         Passed - Recent (6 mo) or future (30 days) visit within the authorizing provider's specialty     Patient had office visit in the last 6 months or has a visit in the next 30 days with authorizing provider or within the authorizing provider's specialty.  See \"Patient Info\" tab in inbasket, or \"Choose Columns\" in Meds & Orders section of the " "refill encounter.            omeprazole (PRILOSEC) 20 MG DR capsule 90 capsule 3     Sig: TAKE 1 CAPSULE (20 MG) BY MOUTH DAILY       PPI Protocol Passed - 2/12/2020  3:05 PM        Passed - Not on Clopidogrel (unless Pantoprazole ordered)        Passed - No diagnosis of osteoporosis on record        Passed - Recent (12 mo) or future (30 days) visit within the authorizing provider's specialty     Patient has had an office visit with the authorizing provider or a provider within the authorizing providers department within the previous 12 mos or has a future within next 30 days. See \"Patient Info\" tab in inbasket, or \"Choose Columns\" in Meds & Orders section of the refill encounter.              Passed - Medication is active on med list        Passed - Patient is age 18 or older        Passed - No active pregnacy on record        Passed - No positive pregnancy test in past 12 months          "

## 2020-02-13 NOTE — TELEPHONE ENCOUNTER
"Requested Prescriptions   Pending Prescriptions Disp Refills     citalopram (CELEXA) 40 MG tablet 90 tablet 1     Sig: Take 1 tablet (40 mg) by mouth daily   Last Written Prescription Date:  11/5/2019  Last Fill Quantity: 90,  # refills: 1   Last office visit: 11/8/2019 with prescribing provider   Future Office Visit:        SSRIs Protocol Passed - 2/12/2020  1:54 PM        Passed - PHQ-9 score less than 5 in past 6 months     Please review last PHQ-9 score.           Passed - Medication is active on med list        Passed - Patient is age 18 or older        Passed - No active pregnancy on record        Passed - No positive pregnancy test in last 12 months        Passed - Recent (6 mo) or future (30 days) visit within the authorizing provider's specialty     Patient had office visit in the last 6 months or has a visit in the next 30 days with authorizing provider or within the authorizing provider's specialty.  See \"Patient Info\" tab in inbasket, or \"Choose Columns\" in Meds & Orders section of the refill encounter.            Prescription approved per [facility] [place] Protocol.  Haylee Mabry RN on 2/13/2020 at 11:53 AM    "

## 2020-04-08 DIAGNOSIS — I10 HTN, GOAL BELOW 140/90: ICD-10-CM

## 2020-04-08 RX ORDER — LOSARTAN POTASSIUM 50 MG/1
50 TABLET ORAL DAILY
Qty: 90 TABLET | Refills: 1 | OUTPATIENT
Start: 2020-04-08

## 2020-04-09 ENCOUNTER — TELEPHONE (OUTPATIENT)
Dept: FAMILY MEDICINE | Facility: CLINIC | Age: 65
End: 2020-04-09

## 2020-04-09 NOTE — TELEPHONE ENCOUNTER
Zuri William PA-C     [facility] faxed request asking for alternative for     Losartan Potassium 50 mg take 1 tablet every day     Liza Jones, Registered Nurse   [facility]

## 2020-04-10 NOTE — TELEPHONE ENCOUNTER
Called [facility] to clarify.  They do not have note of any issue with Losartan RX.  They shipped 3/11/20 for 90 days.  I do not have fax to see if different # that I could call since states fax is from [facility].  Will disregard for now as has 90 day RX.  Connie Farias RN

## 2020-05-29 DIAGNOSIS — E11.9 DIABETES MELLITUS WITHOUT COMPLICATION (H): ICD-10-CM

## 2020-06-01 RX ORDER — METFORMIN HCL 500 MG
TABLET, EXTENDED RELEASE 24 HR ORAL
Qty: 90 TABLET | Refills: 1 | Status: SHIPPED | OUTPATIENT
Start: 2020-06-01 | End: 2020-07-13

## 2020-06-01 NOTE — TELEPHONE ENCOUNTER
Routing refill request to provider for review/approval because:  Labs not current:  A1C  Patient needs to be seen because:  Patient due for visit.    Care team please assist patient in scheduling appt.  PCP please advise on refill.    Melia SEGURA RN, BSN

## 2020-06-18 DIAGNOSIS — I10 HTN, GOAL BELOW 140/90: ICD-10-CM

## 2020-06-18 RX ORDER — HYDROCHLOROTHIAZIDE 12.5 MG/1
12.5 TABLET ORAL DAILY
Qty: 90 TABLET | Refills: 1 | Status: SHIPPED | OUTPATIENT
Start: 2020-06-18 | End: 2020-07-13

## 2020-06-18 RX ORDER — LOSARTAN POTASSIUM 50 MG/1
50 TABLET ORAL DAILY
Qty: 90 TABLET | Refills: 1 | Status: SHIPPED | OUTPATIENT
Start: 2020-06-18 | End: 2020-07-13

## 2020-06-18 NOTE — TELEPHONE ENCOUNTER
Prescription approved per [facility] Refill Protocol.    Jackelyn Marshall RN on 6/18/2020 at 9:10 AM

## 2020-06-29 ENCOUNTER — TELEPHONE (OUTPATIENT)
Dept: FAMILY MEDICINE | Facility: CLINIC | Age: 65
End: 2020-06-29

## 2020-06-29 NOTE — TELEPHONE ENCOUNTER
Fax from [facility] that patient on Citalopram 40 mg and Omeprazole 20 mg.  States  recommends not exceeding dose of Citalopram 20 mg when taking this combination due to increased risk of QT prolongation.  Fax in Zuri's inbasket to review and advise.  Connie Farias RN

## 2020-07-13 ENCOUNTER — VIRTUAL VISIT (OUTPATIENT)
Dept: FAMILY MEDICINE | Facility: CLINIC | Age: 65
End: 2020-07-13
Payer: COMMERCIAL

## 2020-07-13 ENCOUNTER — E-VISIT (OUTPATIENT)
Dept: FAMILY MEDICINE | Facility: CLINIC | Age: 65
End: 2020-07-13

## 2020-07-13 DIAGNOSIS — Z53.9 ERRONEOUS ENCOUNTER--DISREGARD: Primary | ICD-10-CM

## 2020-07-13 DIAGNOSIS — F33.0 MAJOR DEPRESSIVE DISORDER, RECURRENT EPISODE, MILD (H): ICD-10-CM

## 2020-07-13 DIAGNOSIS — I10 HTN, GOAL BELOW 140/90: ICD-10-CM

## 2020-07-13 DIAGNOSIS — E11.9 TYPE 2 DIABETES, HBA1C GOAL < 7% (H): ICD-10-CM

## 2020-07-13 DIAGNOSIS — K21.9 GASTROESOPHAGEAL REFLUX DISEASE WITHOUT ESOPHAGITIS: ICD-10-CM

## 2020-07-13 DIAGNOSIS — G47.00 INSOMNIA, UNSPECIFIED TYPE: ICD-10-CM

## 2020-07-13 PROCEDURE — 99214 OFFICE O/P EST MOD 30 MIN: CPT | Mod: 95 | Performed by: PHYSICIAN ASSISTANT

## 2020-07-13 RX ORDER — LOSARTAN POTASSIUM 50 MG/1
50 TABLET ORAL DAILY
Qty: 90 TABLET | Refills: 1 | Status: SHIPPED | OUTPATIENT
Start: 2020-07-13 | End: 2020-12-28

## 2020-07-13 RX ORDER — TRAZODONE HYDROCHLORIDE 50 MG/1
50-100 TABLET, FILM COATED ORAL
Qty: 180 TABLET | Refills: 3 | Status: SHIPPED | OUTPATIENT
Start: 2020-07-13

## 2020-07-13 RX ORDER — MULTIPLE VITAMINS W/ MINERALS TAB 9MG-400MCG
1 TAB ORAL DAILY
Qty: 100 TABLET | Refills: 3 | Status: SHIPPED | OUTPATIENT
Start: 2020-07-13

## 2020-07-13 RX ORDER — METFORMIN HCL 500 MG
TABLET, EXTENDED RELEASE 24 HR ORAL
Qty: 90 TABLET | Refills: 1 | Status: SHIPPED | OUTPATIENT
Start: 2020-07-13 | End: 2021-02-02

## 2020-07-13 RX ORDER — HYDROCHLOROTHIAZIDE 12.5 MG/1
12.5 TABLET ORAL DAILY
Qty: 90 TABLET | Refills: 1 | Status: SHIPPED | OUTPATIENT
Start: 2020-07-13 | End: 2020-12-28

## 2020-07-13 RX ORDER — CITALOPRAM HYDROBROMIDE 40 MG/1
40 TABLET ORAL DAILY
Qty: 90 TABLET | Refills: 0 | Status: SHIPPED | OUTPATIENT
Start: 2020-07-13 | End: 2020-07-24

## 2020-07-13 ASSESSMENT — PATIENT HEALTH QUESTIONNAIRE - PHQ9
5. POOR APPETITE OR OVEREATING: NOT AT ALL
SUM OF ALL RESPONSES TO PHQ QUESTIONS 1-9: 3

## 2020-07-13 ASSESSMENT — ANXIETY QUESTIONNAIRES
GAD7 TOTAL SCORE: 0
1. FEELING NERVOUS, ANXIOUS, OR ON EDGE: NOT AT ALL
6. BECOMING EASILY ANNOYED OR IRRITABLE: NOT AT ALL
3. WORRYING TOO MUCH ABOUT DIFFERENT THINGS: NOT AT ALL
IF YOU CHECKED OFF ANY PROBLEMS ON THIS QUESTIONNAIRE, HOW DIFFICULT HAVE THESE PROBLEMS MADE IT FOR YOU TO DO YOUR WORK, TAKE CARE OF THINGS AT HOME, OR GET ALONG WITH OTHER PEOPLE: NOT DIFFICULT AT ALL
2. NOT BEING ABLE TO STOP OR CONTROL WORRYING: NOT AT ALL
7. FEELING AFRAID AS IF SOMETHING AWFUL MIGHT HAPPEN: NOT AT ALL
5. BEING SO RESTLESS THAT IT IS HARD TO SIT STILL: NOT AT ALL

## 2020-07-13 NOTE — PROGRESS NOTES
"Angelique Sharp is a 64 year old female who is being evaluated via a billable video visit.      The patient has been notified of following:     \"This video visit will be conducted via a call between you and your physician/provider. We have found that certain health care needs can be provided without the need for an in-person physical exam.  This service lets us provide the care you need with a video conversation.  If a prescription is necessary we can send it directly to your pharmacy.  If lab work is needed we can place an order for that and you can then stop by our lab to have the test done at a later time.    Video visits are billed at different rates depending on your insurance coverage.  Please reach out to your insurance provider with any questions.    If during the course of the call the physician/provider feels a video visit is not appropriate, you will not be charged for this service.\"    Patient has given verbal consent for Video visit? Yes  How would you like to obtain your AVS? Heather  Patient would like the video invitation sent by: Text to cell phone: [phone]  Will anyone else be joining your video visit? No      Subjective     Angelique Sharp is a 64 year old female who presents today via video visit for the following health issues:            History of Present Illness        She eats 2-3 servings of fruits and vegetables daily.She consumes 1 sweetened beverage(s) daily.She exercises with enough effort to increase her heart rate 20 to 29 minutes per day.  She exercises with enough effort to increase her heart rate 4 days per week.   She is taking medications regularly.    Pt wants to discuss about getting refills on all medications [place].     Video Start Time: 1050    Diabetes Follow-up      How often are you checking your blood sugar? Not at all    What concerns do you have today about your diabetes? None     Do you have any of these symptoms? (Select all that " apply)  No numbness or tingling in feet.  No redness, sores or blisters on feet.  No complaints of excessive thirst.  No reports of blurry vision.  No significant changes to weight.    Have you had a diabetic eye exam in the last 12 months? No        BP Readings from Last 2 Encounters:   11/08/19 139/76   10/14/19 (!) 149/90     Hemoglobin A1C (%)   Date Value   11/08/2019 5.7 (H)   05/22/2019 6.2 (H)     LDL Cholesterol Calculated (mg/dL)   Date Value   11/08/2019 100 (H)   10/31/2018 78               Hypertension Follow-up      Do you check your blood pressure regularly outside of the clinic? Yes     Are you following a low salt diet? Yes    Are your blood pressures ever more than 140 on the top number (systolic) OR more   than 90 on the bottom number (diastolic), for example 140/90? No    Depression Followup    How are you doing with your depression since your last visit? No change    Are you having other symptoms that might be associated with depression? No    Have you had a significant life event?  OTHER: moving to [facility]     Are you feeling anxious or having panic attacks?   No    Do you have any concerns with your use of alcohol or other drugs? No    Social History     Tobacco Use     Smoking status: Former Smoker     Years: 20.00     Smokeless tobacco: Never Used     Tobacco comment: 1 pack every 2 weeks---would like to quit   Substance Use Topics     Alcohol use: Yes     Frequency: 2-3 times a week     Drinks per session: 1 or 2     Binge frequency: Never     Comment: rarely     Drug use: No     PHQ 3/15/2019 11/8/2019 7/13/2020   PHQ-9 Total Score 5 0 3   Q9: Thoughts of better off dead/self-harm past 2 weeks Not at all Not at all Not at all     PILLO-7 SCORE 3/15/2019 11/8/2019 7/13/2020   Total Score - - -   Total Score - 0 (minimal anxiety) -   Total Score 1 0 0     Last PHQ-9 7/13/2020   1.  Little interest or pleasure in doing things 0   2.  Feeling down, depressed, or hopeless 0   3.  Trouble  falling or staying asleep, or sleeping too much 2   4.  Feeling tired or having little energy 1   5.  Poor appetite or overeating 0   6.  Feeling bad about yourself 0   7.  Trouble concentrating 0   8.  Moving slowly or restless 0   Q9: Thoughts of better off dead/self-harm past 2 weeks 0   PHQ-9 Total Score 3   Difficulty at work, home, or with people Not difficult at all     PILLO-7  2020   1. Feeling nervous, anxious, or on edge 0   2. Not being able to stop or control worrying 0   3. Worrying too much about different things 0   4. Trouble relaxing 0   5. Being so restless that it is hard to sit still 0   6. Becoming easily annoyed or irritable 0   7. Feeling afraid, as if something awful might happen 0   PILLO-7 Total Score 0   If you checked any problems, how difficult have they made it for you to do your work, take care of things at home, or get along with other people? Not difficult at all     In the past two weeks have you had thoughts of suicide or self-harm?  No.    Do you have concerns about your personal safety or the safety of others?   No    Suicide Assessment Five-step Evaluation and Treatment (SAFE-T)      Patient Active Problem List   Diagnosis     Pain in limb     Other hammer toe (acquired)     Hallux valgus, acquired     Mild major depression (H)     Hypertension     Type 2 diabetes, HbA1c goal < 7% (H)     HTN, goal below 140/90     GERD (gastroesophageal reflux disease)     Hyperlipidemia LDL goal <100     Vitamin D deficiency     Bloody diarrhea     Insomnia     Obesity     Diabetes mellitus without complication (H)     Obesity (BMI 35.0-39.9) with comorbidity (H)     ASCUS of cervix with negative high risk HPV     Major depressive disorder, recurrent episode, mild (H)     Past Surgical History:   Procedure Laterality Date     C NONSPECIFIC PROCEDURE      Lt. leg vein stripping     C NONSPECIFIC PROCEDURE       x 1     C NONSPECIFIC PROCEDURE      vaginal delivery x 1     COLONOSCOPY        COLONOSCOPY  2/11/2014    Procedure: COLONOSCOPY;  COLONOSCOPY    ;  Surgeon: Stewart Torres MD;  Location:  GI     VASCULAR SURGERY         Social History     Tobacco Use     Smoking status: Former Smoker     Years: 20.00     Smokeless tobacco: Never Used     Tobacco comment: 1 pack every 2 weeks---would like to quit   Substance Use Topics     Alcohol use: Yes     Frequency: 2-3 times a week     Drinks per session: 1 or 2     Binge frequency: Never     Comment: rarely     Family History   Problem Relation Age of Onset     Eye Disorder Maternal Grandmother         glaucoma     Diabetes Maternal Grandmother      Breast Cancer Maternal Aunt            Reviewed and updated as needed this visit by Provider         Review of Systems   Constitutional, HEENT, cardiovascular, pulmonary, gi and gu systems are negative, except as otherwise noted.      Objective             Physical Exam     GENERAL: Healthy, alert and no distress  EYES: Eyes grossly normal to inspection.  No discharge or erythema, or obvious scleral/conjunctival abnormalities.  RESP: No audible wheeze, cough, or visible cyanosis.  No visible retractions or increased work of breathing.    SKIN: Visible skin clear. No significant rash, abnormal pigmentation or lesions.  NEURO: Cranial nerves grossly intact.  Mentation and speech appropriate for age.  PSYCH: Mentation appears normal, affect normal/bright, judgement and insight intact, normal speech and appearance well-groomed.      Diagnostic Test Results:  Labs reviewed in Epic        Assessment & Plan     1. Major depressive disorder, recurrent episode, mild (H)  Stable.   - citalopram (CELEXA) 40 MG tablet; Take 1 tablet (40 mg) by mouth daily  Dispense: 90 tablet; Refill: 0    2. Type 2 diabetes, HbA1c goal < 7% (H)  Due to labs will follow-up in clinic  - multivitamin w/minerals (MULTI-VITAMIN) tablet; Take 1 tablet by mouth daily  Dispense: 100 tablet; Refill: 3    3. HTN, goal below  "140/90    - losartan (COZAAR) 50 MG tablet; Take 1 tablet (50 mg) by mouth daily  Dispense: 90 tablet; Refill: 1  - hydrochlorothiazide (HYDRODIURIL) 12.5 MG tablet; Take 1 tablet (12.5 mg) by mouth daily  Dispense: 90 tablet; Refill: 1    4. Gastroesophageal reflux disease without esophagitis    - omeprazole (PRILOSEC) 20 MG DR capsule; TAKE 1 CAPSULE (20 MG) BY MOUTH DAILY  Dispense: 90 capsule; Refill: 2    5. Insomnia, unspecified type    - traZODone (DESYREL) 50 MG tablet; Take 1-2 tablets ( mg) by mouth nightly as needed for sleep  Dispense: 180 tablet; Refill: 3     BMI:   Estimated body mass index is 37.42 kg/m  as calculated from the following:    Height as of 10/14/19: 1.584 m (5' 2.36\").    Weight as of 11/8/19: 93.9 kg (207 lb).   Weight management plan: Discussed healthy diet and exercise guidelines        Work on weight loss  Regular exercise  See Patient Instructions    Return in about 4 weeks (around 8/10/2020) for Diabetes Recheck, Lab Work.    Zuri William PA-C  [facility]      Video-Visit Details    Type of service:  Video Visit    Video End Time:11:11 AM    Originating Location (pt. Location): Home    Distant Location (provider location):  [facility]     [facility]    No follow-ups on file.       Zuri William PA-C    Answers for HPI/ROS submitted by the patient on 7/13/2020   Chronic problems general questions HPI Form  How many servings of fruits and vegetables do you eat daily?: 2-3  On average, how many sweetened beverages do you drink each day (Examples: soda, juice, sweet tea, etc.  Do NOT count diet or artificially sweetened beverages)?: 1  How many minutes a day do you exercise enough to make your heart beat faster?: 20 to 29  How many days a week do you exercise enough to make your heart beat faster?: 4  How many days per week do you miss taking your medication?: 0    "

## 2020-07-14 ASSESSMENT — ANXIETY QUESTIONNAIRES: GAD7 TOTAL SCORE: 0

## 2020-07-24 ENCOUNTER — OFFICE VISIT (OUTPATIENT)
Dept: FAMILY MEDICINE | Facility: CLINIC | Age: 65
End: 2020-07-24
Payer: COMMERCIAL

## 2020-07-24 VITALS
HEART RATE: 87 BPM | RESPIRATION RATE: 16 BRPM | SYSTOLIC BLOOD PRESSURE: 131 MMHG | WEIGHT: 202.4 LBS | TEMPERATURE: 98.9 F | OXYGEN SATURATION: 97 % | DIASTOLIC BLOOD PRESSURE: 78 MMHG | BODY MASS INDEX: 36.59 KG/M2

## 2020-07-24 DIAGNOSIS — N90.7 SEBACEOUS CYST OF LABIA: ICD-10-CM

## 2020-07-24 DIAGNOSIS — E78.5 HYPERLIPIDEMIA LDL GOAL <100: ICD-10-CM

## 2020-07-24 DIAGNOSIS — E11.9 TYPE 2 DIABETES, HBA1C GOAL < 7% (H): ICD-10-CM

## 2020-07-24 DIAGNOSIS — F33.0 MAJOR DEPRESSIVE DISORDER, RECURRENT EPISODE, MILD (H): ICD-10-CM

## 2020-07-24 DIAGNOSIS — K64.4 EXTERNAL HEMORRHOIDS: Primary | ICD-10-CM

## 2020-07-24 DIAGNOSIS — R74.8 ELEVATED LIVER ENZYMES: ICD-10-CM

## 2020-07-24 LAB — HBA1C MFR BLD: 5.8 % (ref 0–5.6)

## 2020-07-24 PROCEDURE — 80076 HEPATIC FUNCTION PANEL: CPT | Performed by: PHYSICIAN ASSISTANT

## 2020-07-24 PROCEDURE — 36415 COLL VENOUS BLD VENIPUNCTURE: CPT | Performed by: PHYSICIAN ASSISTANT

## 2020-07-24 PROCEDURE — 83036 HEMOGLOBIN GLYCOSYLATED A1C: CPT | Performed by: PHYSICIAN ASSISTANT

## 2020-07-24 PROCEDURE — 99214 OFFICE O/P EST MOD 30 MIN: CPT | Performed by: PHYSICIAN ASSISTANT

## 2020-07-24 RX ORDER — HYDROCORTISONE 25 MG/G
CREAM TOPICAL 2 TIMES DAILY PRN
Qty: 30 G | Refills: 1 | Status: SHIPPED | OUTPATIENT
Start: 2020-07-24

## 2020-07-24 RX ORDER — ATORVASTATIN CALCIUM 40 MG/1
TABLET, FILM COATED ORAL
Qty: 90 TABLET | Refills: 3 | Status: SHIPPED | OUTPATIENT
Start: 2020-07-24 | End: 2021-06-24

## 2020-07-24 RX ORDER — HYDROCORTISONE 25 MG/G
CREAM TOPICAL 2 TIMES DAILY PRN
Qty: 30 G | Refills: 1 | Status: SHIPPED | OUTPATIENT
Start: 2020-07-24 | End: 2020-07-24

## 2020-07-24 RX ORDER — CITALOPRAM HYDROBROMIDE 40 MG/1
40 TABLET ORAL DAILY
Qty: 90 TABLET | Refills: 3 | Status: SHIPPED | OUTPATIENT
Start: 2020-07-24 | End: 2021-07-15

## 2020-07-24 NOTE — PROGRESS NOTES
Subjective     Angelique Sharp is a 64 year old female who presents to clinic today for the following health issues:    History of Present Illness        She eats 2-3 servings of fruits and vegetables daily.She consumes 1 sweetened beverage(s) daily.She exercises with enough effort to increase her heart rate 20 to 29 minutes per day.  She exercises with enough effort to increase her heart rate 4 days per week.   She is taking medications regularly.         Concern - Cyst on Bottom   Onset: X1 month    Description:   Just a little bump by her anus    Intensity: No pain    Progression of Symptoms:  same    Accompanying Signs & Symptoms:  No pain, no bleeding.     Previous history of similar problem:   None    Precipitating factors:   Worsened by: Just there    Alleviating factors:  Improved by: Just there    Therapies Tried and outcome: none.  Patient also noticed a little bump on her labia x 1 month.        Patient Active Problem List   Diagnosis     Pain in limb     Other hammer toe (acquired)     Hallux valgus, acquired     Mild major depression (H)     Hypertension     Type 2 diabetes, HbA1c goal < 7% (H)     HTN, goal below 140/90     GERD (gastroesophageal reflux disease)     Hyperlipidemia LDL goal <100     Vitamin D deficiency     Bloody diarrhea     Insomnia     Obesity     Diabetes mellitus without complication (H)     Obesity (BMI 35.0-39.9) with comorbidity (H)     ASCUS of cervix with negative high risk HPV     Major depressive disorder, recurrent episode, mild (H)     Past Surgical History:   Procedure Laterality Date     C NONSPECIFIC PROCEDURE      Lt. leg vein stripping     C NONSPECIFIC PROCEDURE       x 1     C NONSPECIFIC PROCEDURE      vaginal delivery x 1     COLONOSCOPY       COLONOSCOPY  2014    Procedure: COLONOSCOPY;  COLONOSCOPY    ;  Surgeon: Stewart Torres MD;  Location:  GI     VASCULAR SURGERY         Social History     Tobacco Use     Smoking status: Former  Smoker     Packs/day: 0.00     Years: 20.00     Pack years: 0.00     Smokeless tobacco: Never Used     Tobacco comment: 1 pack every 2 weeks---would like to quit   Substance Use Topics     Alcohol use: Yes     Frequency: 2-3 times a week     Drinks per session: 1 or 2     Binge frequency: Never     Comment: rarely     Family History   Problem Relation Age of Onset     Eye Disorder Maternal Grandmother         glaucoma     Diabetes Maternal Grandmother      Breast Cancer Maternal Aunt            Reviewed and updated as needed this visit by Provider         Review of Systems   Constitutional, HEENT, cardiovascular, pulmonary, gi and gu systems are negative, except as otherwise noted.      Objective    /78 (BP Location: Right arm, Patient Position: Sitting, Cuff Size: Adult Regular)   Pulse 87   Temp 98.9  F (37.2  C) (Oral)   Resp 16   Wt 91.8 kg (202 lb 6.4 oz)   LMP 08/01/2007   SpO2 97%   Breastfeeding No   BMI 36.59 kg/m    Body mass index is 36.59 kg/m .  Physical Exam   GENERAL APPEARANCE: healthy, alert and no distress  RESP: lungs clear to auscultation - no rales, rhonchi or wheezes  CV: regular rates and rhythm, normal S1 S2, no S3 or S4 and no murmur, click or rub   (female): pinpoint sebaceous cyst right labia, small grade 2 hemorrhoid  SKIN: no suspicious lesions or rashes            Assessment & Plan     1. External hemorrhoids  Monitor, sitz baths  - hydrocortisone, Perianal, (HYDROCORTISONE) 2.5 % cream; Place rectally 2 times daily as needed for hemorrhoids  Dispense: 30 g; Refill: 1    2. Elevated liver enzymes  Await labs.  - **Hepatic panel FUTURE 2mo    3. Sebaceous cyst of labia  Discussed options. It is small, pt would like to monitor    4. Type 2 diabetes, HbA1c goal < 7% (H)  Due for labs.   - Hemoglobin A1c           Return in about 1 year (around 7/24/2021) for Diabetes Recheck, Physical Exam.    Zuri William PA-C  [facility]

## 2020-07-25 LAB
ALBUMIN SERPL-MCNC: 3.5 G/DL (ref 3.4–5)
ALP SERPL-CCNC: 80 U/L (ref 40–150)
ALT SERPL W P-5'-P-CCNC: 41 U/L (ref 0–50)
AST SERPL W P-5'-P-CCNC: 50 U/L (ref 0–45)
BILIRUB DIRECT SERPL-MCNC: 0.1 MG/DL (ref 0–0.2)
BILIRUB SERPL-MCNC: 0.4 MG/DL (ref 0.2–1.3)
PROT SERPL-MCNC: 7.5 G/DL (ref 6.8–8.8)

## 2020-08-17 ENCOUNTER — TELEPHONE (OUTPATIENT)
Dept: FAMILY MEDICINE | Facility: CLINIC | Age: 65
End: 2020-08-17

## 2020-08-17 NOTE — TELEPHONE ENCOUNTER
Forms/Letter Request    Name of form/letter: [facility]    Have you been seen for this request: Yes     Do we have the form/letter: Yes:     When is form/letter needed by: N/A    How would you like the form/letter returned: Fax    Patient Notified form requests are processed in 3-5 business days:No    Okay to leave a detailed message? Yes Cell number on file:    Telephone Information:   Mobile [phone]

## 2020-09-17 ENCOUNTER — TELEPHONE (OUTPATIENT)
Dept: FAMILY MEDICINE | Facility: CLINIC | Age: 65
End: 2020-09-17

## 2020-09-17 NOTE — TELEPHONE ENCOUNTER
Patient Quality Outreach Summary      Summary:    Patient is due/failing the following:   Eye Exam    Type of outreach:    per chart up to date routed to abstract.    Questions for provider review:    None                                                                                                                    HODAN Llanos       Chart routed to Care Team.

## 2020-12-24 DIAGNOSIS — I10 HTN, GOAL BELOW 140/90: ICD-10-CM

## 2020-12-28 RX ORDER — LOSARTAN POTASSIUM 50 MG/1
TABLET ORAL
Qty: 90 TABLET | Refills: 3 | Status: SHIPPED | OUTPATIENT
Start: 2020-12-28

## 2020-12-28 RX ORDER — HYDROCHLOROTHIAZIDE 12.5 MG/1
TABLET ORAL
Qty: 90 TABLET | Refills: 3 | Status: SHIPPED | OUTPATIENT
Start: 2020-12-28

## 2020-12-28 NOTE — TELEPHONE ENCOUNTER
Routing refill request to provider for review/approval because:  Labs not current:  BP labs  Michelle Acuna RN, BSN  Message handled by CLINIC NURSE.

## 2021-01-05 ENCOUNTER — PATIENT OUTREACH (OUTPATIENT)
Dept: FAMILY MEDICINE | Facility: CLINIC | Age: 66
End: 2021-01-05

## 2021-01-05 DIAGNOSIS — R87.610 ASCUS OF CERVIX WITH NEGATIVE HIGH RISK HPV: ICD-10-CM

## 2021-01-05 NOTE — TELEPHONE ENCOUNTER
FYI to provider - Patient is lost to pap tracking follow-up. Attempts to contact pt have been made per reminder process and there has been no reply and/or no appt scheduled.       2011, 2012, 2015 NIL paps.  11/8/19 ASCUS pap with Neg HPV. Plan cotest in 1 year per Ob/Gyn.    12/01/20 Reminder MyChart- read by pt  1/5/21 No appt. Lost to follow-up for pap tracking

## 2021-01-14 ENCOUNTER — HEALTH MAINTENANCE LETTER (OUTPATIENT)
Age: 66
End: 2021-01-14

## 2021-01-31 DIAGNOSIS — E11.9 TYPE 2 DIABETES, HBA1C GOAL < 7% (H): ICD-10-CM

## 2021-01-31 DIAGNOSIS — I10 HTN, GOAL BELOW 140/90: Primary | ICD-10-CM

## 2021-02-02 RX ORDER — METFORMIN HCL 500 MG
TABLET, EXTENDED RELEASE 24 HR ORAL
Qty: 90 TABLET | Refills: 3 | Status: SHIPPED | OUTPATIENT
Start: 2021-02-02

## 2021-02-02 NOTE — TELEPHONE ENCOUNTER
Routing refill request to provider for review/approval because:  A1c & last visit >6 months ago:  Lab Test 07/24/20  1153   A1C 5.8*

## 2021-02-05 ENCOUNTER — PATIENT OUTREACH (OUTPATIENT)
Dept: FAMILY MEDICINE | Facility: CLINIC | Age: 66
End: 2021-02-05

## 2021-02-05 NOTE — TELEPHONE ENCOUNTER
Patient Quality Outreach      Summary:    Patient has the following on her problem list/HM:     Diabetes    Last A1C:  Lab Results   Component Value Date    A1C 5.8 07/24/2020    A1C 5.7 11/08/2019       Last LDL:    Lab Results   Component Value Date     11/08/2019       Is the patient on a Statin? Yes          Is the patient on Aspirin? Yes    Medications     HMG CoA Reductase Inhibitors     atorvastatin (LIPITOR) 40 MG tablet       Salicylates     aspirin 81 MG tablet             Last three blood pressure readings:  BP Readings from Last 3 Encounters:   07/24/20 131/78   11/08/19 139/76   10/14/19 (!) 149/90            Tobacco Use      Smoking status: Former Smoker        Packs/day: 0.00        Years: 20.00        Pack years: 0      Smokeless tobacco: Never Used      Tobacco comment: 1 pack every 2 weeks---would like to quit          Hypertension   Last three blood pressure readings:  BP Readings from Last 3 Encounters:   07/24/20 131/78   11/08/19 139/76   10/14/19 (!) 149/90     Blood pressure: Passed    HTN Guidelines:  ? 139/89     Patient is due/failing the following:   A1C, LDL (Fasting), Eye Exam and Microalbumin, Breast Cancer Screening - Mammogram, Cervical Cancer Screening - PAP Needed and Annual wellness, date due: 11/8/20    Type of outreach:    Please contact patient    Questions for provider review:    None                                                                                          Chart routed to Care Team.

## 2021-02-05 NOTE — LETTER
[facility]  [id number] [address] [id number] [phone]  February 5, 2021    Angelique Sharp  [address] [address] [address]    Dear Angelique,    I care about your health and have reviewed your health plan. I have reviewed your medical conditions, medication list, and lab results and am making recommendations based on this review, to better manage your health.    You are in particular need of attention regarding:  -Breast Cancer Screening  -Cervical Cancer Screening  -Wellness (Physical) Visit     I am recommending that you:  -schedule a WELLNESS (Physical) APPOINTMENT with me.   I will check fasting labs the same day - nothing to eat except water and meds for 8-10 hours prior.  -schedule a MAMMOGRAM which is due. We have mammogram available at our clinic; please call [phone].   Please disregard this reminder if you have had this exam elsewhere within the last year.  It would be helpful for us to have a copy of your mammogram report in our file so that we can best coordinate your care.    Here is a list of Health Maintenance topics that are due now or due soon:  Health Maintenance Due   Topic Date Due     MAMMO SCREENING  04/30/2020     EYE EXAM  08/01/2020     PREVENTIVE CARE VISIT  11/08/2020     BMP  11/08/2020     LIPID  11/08/2020     MICROALBUMIN  11/08/2020     DIABETIC FOOT EXAM  11/08/2020     ANNUAL REVIEW OF HM ORDERS  11/08/2020     PAP FOLLOW-UP  11/08/2020     HPV FOLLOW-UP  11/08/2020     PHQ-9  01/13/2021     A1C  01/24/2021       Please call us at [phone] (or use [employer]) to address the above recommendations.     Thank you for trusting [facility] and we appreciate the opportunity to serve you.  We look forward to supporting your healthcare needs in the future.    Healthy Regards,    Zuri William PA-C

## 2021-03-14 ENCOUNTER — HEALTH MAINTENANCE LETTER (OUTPATIENT)
Age: 66
End: 2021-03-14

## 2021-03-30 ENCOUNTER — TELEPHONE (OUTPATIENT)
Dept: FAMILY MEDICINE | Facility: CLINIC | Age: 66
End: 2021-03-30

## 2021-03-30 NOTE — TELEPHONE ENCOUNTER
Summary:    Patient is due/failing the following:   Eye exam    Reviewed:  [x] CARE EVERYWHERE  [x] LAST OV NOTE INCLUDING ENDO  [x] FYI TAB  [x] MYCHART ACTIVE?  [x] LAST PANEL ENCOUNTER  [x] FUTURE APPTS  [x] IMMUNIZATIONS          Action needed:   Patient needs referral/order: eye exam    Type of outreach:    Sent [employer] message.                                                                               Renee Natarajan/[facility]---[facility]

## 2021-04-22 DIAGNOSIS — K21.9 GASTROESOPHAGEAL REFLUX DISEASE WITHOUT ESOPHAGITIS: ICD-10-CM

## 2021-06-21 ENCOUNTER — TELEPHONE (OUTPATIENT)
Dept: FAMILY MEDICINE | Facility: CLINIC | Age: 66
End: 2021-06-21

## 2021-06-21 NOTE — TELEPHONE ENCOUNTER
Patient Quality Outreach Summary      Summary:    Patient is due/failing the following:   Breast Cancer Screening - Mammogram and Annual wellness, date due: 11/08/2020    Type of outreach:    Phone, spoke to patient/parent. Pt having mammo in Aug.    Questions for provider review:    None                                                                                                                    Mara Hinds MA  [facility].         Chart routed to none.

## 2021-06-23 DIAGNOSIS — E78.5 HYPERLIPIDEMIA LDL GOAL <100: ICD-10-CM

## 2021-06-24 ENCOUNTER — TELEPHONE (OUTPATIENT)
Dept: FAMILY MEDICINE | Facility: CLINIC | Age: 66
End: 2021-06-24

## 2021-06-24 RX ORDER — ATORVASTATIN CALCIUM 40 MG/1
TABLET, FILM COATED ORAL
Qty: 90 TABLET | Refills: 0 | Status: SHIPPED | OUTPATIENT
Start: 2021-06-24

## 2021-06-24 NOTE — TELEPHONE ENCOUNTER
Routing refill request to provider for review/approval because:  Labs not current:  LDL- last done 11/8/19  Patient needs to be seen because it has been more than 1 year since last office visit.  Needs yearly visit 7/24/21.  No upcoming appt.    Connie Farias RN

## 2021-06-24 NOTE — TELEPHONE ENCOUNTER
Called pt , notified. Pt states has moved to another area and transferred care to another clinic.     Jeovany Mullen on 6/24/2021 at 10:34 AM

## 2021-06-24 NOTE — TELEPHONE ENCOUNTER
Summary:    Patient is due/failing the following:   Eye exam    Reviewed:  [x] CARE EVERYWHERE  [x] LAST OV NOTE INCLUDING ENDO  [x] FYI TAB  [x] MYCHART ACTIVE?  [x] LAST PANEL ENCOUNTER  [x] FUTURE APPTS  [x] IMMUNIZATIONS          Action needed:   Patient needs office visit for eye exam.    Type of outreach:    per patient is no longer coming to the  clinic, recently [place]                                                                               Renee Farone/[facility]---[facility]

## 2021-10-24 ENCOUNTER — HEALTH MAINTENANCE LETTER (OUTPATIENT)
Age: 66
End: 2021-10-24

## 2022-02-13 ENCOUNTER — HEALTH MAINTENANCE LETTER (OUTPATIENT)
Age: 67
End: 2022-02-13

## 2022-06-05 ENCOUNTER — HEALTH MAINTENANCE LETTER (OUTPATIENT)
Age: 67
End: 2022-06-05

## 2022-10-15 ENCOUNTER — HEALTH MAINTENANCE LETTER (OUTPATIENT)
Age: 67
End: 2022-10-15

## 2023-03-26 ENCOUNTER — HEALTH MAINTENANCE LETTER (OUTPATIENT)
Age: 68
End: 2023-03-26

## 2023-10-29 ENCOUNTER — HEALTH MAINTENANCE LETTER (OUTPATIENT)
Age: 68
End: 2023-10-29

## 2024-05-26 ENCOUNTER — HEALTH MAINTENANCE LETTER (OUTPATIENT)
Age: 69
End: 2024-05-26